# Patient Record
Sex: MALE | Race: WHITE | Employment: OTHER | URBAN - METROPOLITAN AREA
[De-identification: names, ages, dates, MRNs, and addresses within clinical notes are randomized per-mention and may not be internally consistent; named-entity substitution may affect disease eponyms.]

---

## 2022-11-09 ENCOUNTER — HOSPITAL ENCOUNTER (OUTPATIENT)
Dept: PHYSICAL THERAPY | Age: 37
Discharge: HOME | End: 2022-11-09
Payer: OTHER GOVERNMENT

## 2022-11-09 PROCEDURE — 97530 THERAPEUTIC ACTIVITIES: CPT

## 2022-11-09 PROCEDURE — 97161 PT EVAL LOW COMPLEX 20 MIN: CPT

## 2022-11-09 NOTE — THERAPY EVALUATION
[facility] [email or web address]  [address] [facility]  [phone] fax    Plan of Care/ Statement of Necessity for Physical Therapy Services    Patient name: Srinivas Cadena Start of Care: 2022   Referral source: Marlin Bhatia MD : 1985    Medical Diagnosis: Fecal incontinence [R15.9]  Payor:  / Plan: Saul Beck 72 / Product Type:  /  Onset Date:2022    Treatment Diagnosis: bowel incontinence, PFD, difficulty voiding   Prior Hospitalization: see medical history Provider#: [id number]   Medications: Verified on Patient summary List    Comorbidities: history of malignant carcinoid tumor, nissen fundoplication, colon resection, chemo port placement, ileostomy reversal   Prior Level of Function: ind with all mobility, no leakage           The Plan of Care and following information is based on the information from the initial evaluation. Assessment/ alvarado information: Mr. Srinivas Cadena is a 43 y/o, M who present with c/o fecal incontinence. Problem started since 2022 after a surgery (ileostomy reversal). He had multiple procedures done since May 1956 (nissen fundoplication, colon resection, chemo port placement) due to malignant carcinoid tumor. He is able to have more awareness with bowel urgency at this time. He describes feeling \"a little catch and he's unable to close the region to get to the toilet. \" He has significant problem with holding gas; he notes more pressure with gas. Leakage usually happens mostly in the morning & evening. He has incomplete emptying sensation, needs to return to void 3-4 times in the morning, 2-3 times in the morning. Bowel frequency is 3-10 times/day. He still uses stool softener & Metamucil about 2-3 times a day which helps min with forming fecal matter. He uses a couple pads a week depending on the bowel consistency. Pt denies any problem with bladder. Some pain/burning with straining.  He had history of internal hemorrhoid (4 times). Evaluation reveals patient with significant hypertonicity & hypersensitivity of rectal sphincter, puborectalis & pubococcygeus muscles. Strength & endurance deemed fair (2/5, 14 sec hold). Also noted challenged with relaxation. Anal reflex was intact. Patient may benefit from physical therapy to address these limitations to improve his QOL. Evaluation Complexity History HIGH Complexity :3+ comorbidities / personal factors will impact the outcome/ POC ; Examination LOW Complexity : 1-2 Standardized tests and measures addressing body structure, function, activity limitation and / or participation in recreation  ;Presentation MEDIUM Complexity : Evolving with changing characteristics  ; Clinical Decision Making MEDIUM Complexity : FOTO score of 26-74  Overall Complexity Rating: LOW     Problem List: Pelvic pain/dysfunction, Decreased pelvic floor mm awareness, Decreased pelvic floor mm strength, Use of accessory muscles, Improper voiding habits, and Hypertonus of pelvic floor    Treatment Plan may include any combination of the following:   Therapeutic exercise, Urge suppression techniques, Neuromuscular re-education, Manual therapy, Physical agent/modality, and Patient education  Patient / Family readiness to learn indicated by: asking questions, trying to perform skills, and interest    Persons(s) to be included in education: patient (P)    Barriers to Learning/Limitations: None    Patient Goal (s): improve leakage    Patient Self Reported Health Status: good    Rehabilitation Potential: good    Short Term Goals: To be accomplished in 4 weeks:   1. Patient will demonstrate accurate performance of home exercise program as adjunct to PT clinic visits. Eval status: will review next visit     2. Patient will have decreased fecal incontinence episodes to < or =3x/week for more normal function. Eval status: more than 1x/week    3.  Patient will report at least 50% improvement with ease for voiding bowel to improve his QOL. Eval status: difficulty with emptying. Long Term Goals: To be accomplished in 8 weeks:   1. Patient will be independent in HEP performance for maintenance of pelvic floor program and increased quality of life. Eval status: will review next visit     2. Patient will have increased PFM motor performance by >/= 1/2 to 1 grade for improvement in function and increased quality of life. Eval status: 2/5     3. Patient will have decreased fecal incontinence episodes to < or =4x/month for more normal function. Eval status: more than 1x/week     4. Patient will have FOTO score change of 11 points indicating improvement in function. Eval status: 48      Frequency / Duration: Patient to be seen 1-2 times per week for 8 weeks. Patient/ Caregiver education and instruction: Diagnosis, prognosis, Proper Voiding Habits, Diet, Pain Management, Exercises, and Bladder Retraining      Terry Berry 11/9/2022 8:29 AM    ________________________________________________________________________    I certify that the above Therapy Services are being furnished while the patient is under my care. I agree with the treatment plan and certify that this therapy is necessary.     [de-identified] Signature:____________Date:_________TIME:________     Ligia Singh MD  ** Signature, Date and Time must be completed for valid certification **      Please sign and return to [facility]   [address] [place] [address]  [phone] fax

## 2022-11-09 NOTE — THERAPY EVALUATION
PF Daily Treatment Note  Patient Name: Hema Mcclain  Date:2022  [x]  Patient  Verified  Insurance:Payor: MICHAEL / Plan: Saul Beck 72 / Product Type:  /   In time: 3:01  Out time: 3:50  Total Treatment Time (min): 49  Total Timed Codes (min): 25  1:1 Treatment Time ( only): 52   Visit #: 1 of 1    Treatment Area: [x] Pelvic Floor     [] Other:  SUBJECTIVE  Pain Level (0-10 scale): 0/10  Any medication changes, allergies to medications, adverse drug reactions, diagnosis change, or new procedure performed?: [x] No    [] Yes (see summary sheet for update)    Mr. Hema Mcclain is a 41 y/o, M who present with c/o fecal incontinence. Problem started since 2022 after a surgery . He had multiple surgeries (nissen fundoplication, colon resection, chemo port placement, ileostomy reversal) due to malignant carcinoid tumor. He was able to have more awareness with bowel urgency. He described it as a little catch and he's unable to close the region. He has significant problem with holding gas; he notes more pressure with gas. Leakage usually happens mostly in the morning & evening. He has incomplete emptying sensation, needs to return to void 3-4 times in the morning, 2-3 times in the morning. Bowel frequency is 3-10 times/day. He still uses stool softener daily & Metamucil about 2-3 times a day which helps min with forming fecal matter. He uses a couple pads a week depending on the bowel consistency. He denies any problem with bladder. Some pain/burning with straining. He had history of internal hemorrhoid (4 times).         Pelvic Floor Dysfunction Evaluation    Musculoskeletal Screen:    Skin Integrity:  [] Healthy [x] Red  [] Labia Atrophy [] Fragile    Sensation: [] Intact [] Diminished:    Muscle Bulk: [] Symmetrical  [] Well-developed [] Atrophied:  []L   []R   []B    Prolapse: [] Cystocele:   [] Rectocele:    PERF Score (Performance/Endurance/Repetitions/Flicks)   P: 2 E: 14 seconds R: F: Total:    Patient has failed previous pelvic floor muscle training? [] Yes    [] No    EMG Evaluation:  [] N/A [] Deferred secondary to:    Channel A: Electrode type:  [] Internal    [] Surface    [] Vaginal    [] Rectal  Channel B: Electrode location:    Baseline Resting Tone (1 minute)  Channel A (microvolts): Quality:  [] Normal [] Irradic [] Elevated  Channel B (microvolts): Slow Twitch: (10 second hold, 20 second rest)  Channel A (microvolts): Quality:[] Quick/slow rise [] Low net rise  Net rise (microvolts):   [] Slow Relaxation [] Incoordination       [] Unable to contract [] Fatigues at (sec):       [] Elevated baseline between contractions    Channel B (microvolts): Use of Accessory Muscles: [] Minimal  Net rise (microvolts):   [] Moderate  [] Excessive       [] No use of accessory muscles    Fast Twitch (3 second hold, 10 second rest)  Channel A (microvolts): Quality:[] Quick/slow rise [] Low net rise  Net rise (microvolts):   [] Slow Relaxation [] Incoordination       [] Unable to contract [] Fatigues at (sec):       [] Elevated baseline between contractions    Channel B (microvolts):  Use of Accessory Muscles: [] Minimal  Net rise (microvolts):   [] Moderate  [] Excessive       [] No use of accessory muscles    Optional Tests:  Lower abdominal strength: /5    Comments/Additional Tests:      OBJECTIVE    24 min eval    25 min Therapeutic Activity:  []  See flow sheet :Pt edu within scope of practice on prognosis, POC, PFM anatomy/physiology, bowel consistency, bowel irritants, HEP   Rationale: Increase pelvic floor muscle strength, Improve quality of pelvic floor contractions, Decrease resting tone of the pelvic floor, Increase tissue extensibility of the pelvic floor muscles, Increase core strength, Inhibit abnormal muscle activity, and Improve lumbosacral and coccygeal mobility in order to Increase fecal continence, Decrease bowel urgency, Improve frequency and ease of bowel movements, and Improve ability to perform ADLs. min Patient Education: [x] Review HEP    [] Progressed/Changed HEP based on:   [] positioning   [] body mechanics   [] transfers   [] heat/ice application        Other Objective/Functional Measures:   []baseline resting tone: []slow twitch mms   []fast twitch mms      Pain Level (0-10 scale) post treatment: 0/10    ASSESSMENT/Changes in Function: see POC please    []  Decrease # of leaks   [] No change []  Improving [] Resolved     []  Decrease hypertonus [] No change []  Improving [] Resolved     []  Increase void interval [] No change []  Improving [] Resolved     []  Increase PF strength [] No change []  Improving [] Resolved     []  Increase PF endurance [] No change []  Improving [] Resolved     []  Increase endurance [] No change []  Improving [] Resolved     []  Decrease # of pads [] No change []  Improving [] Resolved     []  Decrease pain [] No change []  Improving [] Resolved       Patient will continue to benefit from skilled PT services to modify and progress therapeutic interventions, address functional mobility deficits, address ROM deficits, address strength deficits, analyze and address soft tissue restrictions, analyze and cue movement patterns, analyze and modify body mechanics/ergonomics, assess and modify postural abnormalities, address imbalance/dizziness and instruct in home and community integration to attain remaining goals.      [x]  See Plan of Care         PLAN  [x]  Upgrade activities as tolerated     [x]  Continue plan of care  []  Update interventions per flow sheet       []  Discharge due to:_  []  Other:_          Vernon Jim 11/9/2022  8:28 AM

## 2022-11-14 ENCOUNTER — HOSPITAL ENCOUNTER (OUTPATIENT)
Dept: PHYSICAL THERAPY | Age: 37
Discharge: HOME | End: 2022-11-14
Payer: OTHER GOVERNMENT

## 2022-11-14 PROCEDURE — 97112 NEUROMUSCULAR REEDUCATION: CPT

## 2022-11-14 PROCEDURE — 97530 THERAPEUTIC ACTIVITIES: CPT

## 2022-11-14 NOTE — PROGRESS NOTES
PF DAILY TREATMENT NOTE 3    Patient Name: Sofia Núñez  Date:2022  : 1985  [x]  Patient  Verified  Payor: MICHAEL / Plan: Saul Beck 74 / Product Type:  /    In time: 9:35  Out time: 10:32  Total Treatment Time (min): 62  Visit #: 2 of 16    Treatment Area: [x] Pelvic Floor     [] Other:    SUBJECTIVE  Pain Level (0-10 scale): 0/10  Any medication changes, allergies to medications, adverse drug reactions, diagnosis change, or new procedure performed?: [x] No    [] Yes (see summary sheet for update)  Subjective functional status/changes:   [] No changes reported  Pt reports increasing fiber intake during the last 1 week and he's been having ~10 BMs a day. Pt reports no straining and good amount voided most of the time. OBJECTIVE    23 min Therapeutic Activity:  []  See flow sheet :    []  Increase Tissue extensibility        [x]  Assess fiber intake    [x]  Assess voiding habits  [x]  Assess bowel habits  []  Other:   Rationale: Increase pelvic floor muscle strength, Improve quality of pelvic floor contractions, Decrease resting tone of the pelvic floor, Increase tissue extensibility of the pelvic floor muscles, Increase core strength, Inhibit abnormal muscle activity, and Improve lumbosacral and coccygeal mobility in order to Increase fecal continence, Decrease bowel urgency, Improve frequency and ease of bowel movements, and Improve ability to perform ADLs.       34 min Neuromuscular Re-education:  []  See flow sheet :   [x]  Pelvic floor strengthening                 []  Pelvic floor downtraining  [x]  Quality pelvic floor contractions       [x]  Relaxation techniques  []  Urge suppression exercises  []  Other:  Rationale: Increase pelvic floor muscle strength, Improve quality of pelvic floor contractions, Decrease resting tone of the pelvic floor, Increase tissue extensibility of the pelvic floor muscles, Increase core strength, Inhibit abnormal muscle activity, and Improve lumbosacral and coccygeal mobility in order to Increase fecal continence, Decrease bowel urgency, Improve frequency and ease of bowel movements, and Improve ability to perform ADLs. With   [] TE   [] TA   [] neuro  [] manual   [] other: Patient Education: [x] Review HEP    [] Progressed/Changed HEP based on:   [] positioning   [] body mechanics   [] transfers   [] heat/ice application    [] other:      Other Objective/Functional Measures:   []baseline resting tone:    []slow twitch mms   []fast twitch mms    Pain Level (0-10 scale) post treatment: 0/10    ASSESSMENT/Changes in Function: Pt demonstrates good understanding with appropriate fiber intake, healthy bowel & bladder function and HEP. He's challenged with prolonged holding of PFM contraction, may not benefit from Presley's protocol, will perform with Biofeedback to re-assess next visit. Progress internal manual/pelvic wand use as tolerated.      []  Decrease # of leaks   [] No change []  Improving [] Resolved     []  Decrease hypertonus [] No change []  Improving [] Resolved     []  Increase void interval [] No change []  Improving [] Resolved     []  Increase PF strength [] No change []  Improving [] Resolved     []  Increase PF endurance [] No change []  Improving [] Resolved     []  Increase endurance [] No change []  Improving [] Resolved     []  Decrease # of pads [] No change []  Improving [] Resolved     []  Decrease pain [] No change []  Improving [] Resolved     []  Increased coordination [] No change []  Improving [] Resolved     []  Increased Bowel Frequency [] No change []  Improving [] Resolved       Patient will continue to benefit from skilled PT services to modify and progress therapeutic interventions, address functional mobility deficits, address ROM deficits, address strength deficits, analyze and address soft tissue restrictions, analyze and cue movement patterns, analyze and modify body mechanics/ergonomics, assess and modify postural abnormalities, address imbalance/dizziness, and instruct in home and community integration to attain remaining goals. [x]  See Plan of Care  []  See progress note/recertification  []  See Discharge Summary         Progress towards goals / Updated goals:  Short Term Goals: To be accomplished in 4 weeks:   1. Patient will demonstrate accurate performance of home exercise program as adjunct to PT clinic visits. Eval status: will review next visit  Current: good understanding 11-14-22     2. Patient will have decreased fecal incontinence episodes to < or =3x/week for more normal function. Eval status: more than 1x/week     3. Patient will report at least 50% improvement with ease for voiding bowel to improve his QOL. Eval status: difficulty with emptying. Long Term Goals: To be accomplished in 8 weeks:   1. Patient will be independent in HEP performance for maintenance of pelvic floor program and increased quality of life. Eval status: will review next visit     2. Patient will have increased PFM motor performance by >/= 1/2 to 1 grade for improvement in function and increased quality of life. Eval status: 2/5     3. Patient will have decreased fecal incontinence episodes to < or =4x/month for more normal function. Eval status: more than 1x/week     4. Patient will have FOTO score change of 11 points indicating improvement in function.   Eval status: 48      PLAN  [x]  Upgrade activities as tolerated     [x]  Continue plan of care  []  Update interventions per flow sheet       []  Discharge due to:_  []  Other:_      Cleve Pearson 11/14/2022  7:56 AM    Future Appointments   Date Time Provider Zoraida Marsh   11/14/2022  9:45 AM Dianna Keyes [id number] [facility]   11/28/2022 10:30 AM Iraj MYLES [id number] [facility]   12/5/2022  9:00 AM Terry Beckford [id number] [facility]   12/7/2022 10:00 AM Iraj MYLES [id number] [facility]   12/12/2022  9:00 AM Dianna Keyes [id number] [facility]   12/14/2022 11:30 AM Lucie Patricia Terry MYLES [id number] [facility]   12/19/2022  9:00 AM Earlean Canavan [id number] [facility]   12/21/2022 11:30 AM Earlean Canavan [id number] [facility]

## 2022-11-28 ENCOUNTER — HOSPITAL ENCOUNTER (OUTPATIENT)
Dept: PHYSICAL THERAPY | Age: 37
Discharge: HOME | End: 2022-11-28
Payer: OTHER GOVERNMENT

## 2022-11-28 PROCEDURE — 90912 BFB TRAINING 1ST 15 MIN: CPT

## 2022-11-28 PROCEDURE — 90913 BFB TRAINING EA ADDL 15 MIN: CPT

## 2022-11-28 PROCEDURE — 97112 NEUROMUSCULAR REEDUCATION: CPT

## 2022-11-28 PROCEDURE — 97530 THERAPEUTIC ACTIVITIES: CPT

## 2022-11-28 NOTE — PROGRESS NOTES
PF DAILY TREATMENT NOTE 3-16    Patient Name: Zack Eubanks  Date:2022  : 1985  [x]  Patient  Verified  Payor:  / Plan: [facility] / Product Type:  /    In time: 10:32  Out time:11:35  Total Treatment Time (min): 63  Visit #: 3 of 16    Treatment Area: [x] Pelvic Floor     [] Other:    SUBJECTIVE  Pain Level (0-10 scale): 5/10 migraine  Any medication changes, allergies to medications, adverse drug reactions, diagnosis change, or new procedure performed?: [x] No    [] Yes (see summary sheet for update)  Subjective functional status/changes:   [] No changes reported  Pt reports doing his best with exercises & bowel consistency management.     OBJECTIVE    Modality rationale: increase tissue extensibility and increase muscle contraction/control to improve the patients ability to improve bowel leakage   Min Type Additional Details    [] Estim:  []Unatt       []IFC  []Premod                        []Other:  []w/ice   []w/heat  Position:  Location:   30 [] Estim: []Att    []TENS instruct  []NMES                    [x]Other: Biofeedback  []w/US   []w/ice   []w/heat  Position: supine, wedge under knees  Location: Internal sensor for PFM    []  Ultrasound: []Continuous   [] Pulsed                           []1MHz   []3MHz Position:  Location:    []  Ice     []  heat  []  Ice massage  []  Laser   []  Anodyne Position:  Location:   [] Skin assessment post-treatment:  [x]intact []redness- no adverse reaction    []redness - adverse reaction:       23 min Therapeutic Activity:  []  See flow sheet :    []  Increase Tissue extensibility        [x]  Assess fiber intake    [x]  Assess voiding habits  [x]  Assess bowel habits  [x]  Other: HEP progression, pelvic wand use  Rationale: Increase pelvic floor muscle strength, Improve quality of pelvic floor contractions, Decrease resting tone of the pelvic floor, Increase tissue extensibility of the pelvic floor muscles, Increase core strength, Inhibit abnormal muscle activity, and Improve lumbosacral and coccygeal mobility in order to Increase fecal continence, Decrease bowel urgency, Improve frequency and ease of bowel movements, and Improve ability to perform ADLs. 10 min Neuromuscular Re-education:  []  See flow sheet :   []  Pelvic floor strengthening                 []  Pelvic floor downtraining  []  Quality pelvic floor contractions       [x]  Relaxation techniques (mindfulness, diaphragmatic breathing with jaw relaxation & prolonged exhallation)  []  Urge suppression exercises  []  Other:  Rationale: Increase pelvic floor muscle strength, Improve quality of pelvic floor contractions, Decrease resting tone of the pelvic floor, Increase tissue extensibility of the pelvic floor muscles, Increase core strength, Inhibit abnormal muscle activity, and Improve lumbosacral and coccygeal mobility in order to Increase fecal continence, Decrease bowel urgency, Improve frequency and ease of bowel movements, and Improve ability to perform ADLs. With   [] TE   [] TA   [] neuro  [] manual   [] other: Patient Education: [x] Review HEP    [] Progressed/Changed HEP based on:   [] positioning   [] body mechanics   [] transfers   [] heat/ice application    [] other:      Other Objective/Functional Measures:   []baseline resting tone: 22-30 µV; able to improve with reluxation/breathing after 2-3 minutes; tone improved to 12-15 µV  []slow twitch mms 17 sec hold, 15 sec rest, 5 rep: work: 31.7 µV, rest: 11.1 µV   Min challenge with maintaining PFM contraction for 17 sec after 3 reps  Improved coordination and max squeeze at the beginning of contraction  HEP: 5 reps, 17 sec hold, 15 sec rest, 1-4 sets/day   fast twitch mms: good coordination with fast twist     Pain Level (0-10 scale) post treatment: 5/10 migraine     ASSESSMENT/Changes in Function: Pt demonstrates improving awareness & coordination of PFM.  Will progress with internal as tolerated to improve resting tone. []  Decrease # of leaks   [] No change []  Improving [] Resolved     []  Decrease hypertonus [] No change []  Improving [] Resolved     []  Increase void interval [] No change []  Improving [] Resolved     []  Increase PF strength [] No change []  Improving [] Resolved     []  Increase PF endurance [] No change []  Improving [] Resolved     []  Increase endurance [] No change []  Improving [] Resolved     []  Decrease # of pads [] No change []  Improving [] Resolved     []  Decrease pain [] No change []  Improving [] Resolved     []  Increased coordination [] No change []  Improving [] Resolved     []  Increased Bowel Frequency [] No change []  Improving [] Resolved       Patient will continue to benefit from skilled PT services to modify and progress therapeutic interventions, address functional mobility deficits, address ROM deficits, address strength deficits, analyze and address soft tissue restrictions, analyze and cue movement patterns, analyze and modify body mechanics/ergonomics, assess and modify postural abnormalities, address imbalance/dizziness, and instruct in home and community integration to attain remaining goals. [x]  See Plan of Care  []  See progress note/recertification  []  See Discharge Summary         Progress towards goals / Updated goals:  Short Term Goals: To be accomplished in 4 weeks:   1. Patient will demonstrate accurate performance of home exercise program as adjunct to PT clinic visits. Eval status: will review next visit  Current: good understanding 11-14-22     2. Patient will have decreased fecal incontinence episodes to < or =3x/week for more normal function. Eval status: more than 1x/week     3. Patient will report at least 50% improvement with ease for voiding bowel to improve his QOL. Eval status: difficulty with emptying. Long Term Goals: To be accomplished in 8 weeks:   1.  Patient will be independent in HEP performance for maintenance of pelvic floor program and increased quality of life. Eval status: will review next visit     2. Patient will have increased PFM motor performance by >/= 1/2 to 1 grade for improvement in function and increased quality of life. Eval status: 2/5     3. Patient will have decreased fecal incontinence episodes to < or =4x/month for more normal function. Eval status: more than 1x/week     4. Patient will have FOTO score change of 11 points indicating improvement in function.   Eval status: 48    PLAN  [x]  Upgrade activities as tolerated     [x]  Continue plan of care  []  Update interventions per flow sheet       []  Discharge due to:_  []  Other:_      Jodell Ser 11/28/2022  8:02 AM    Future Appointments   Date Time Provider Zoraida Marsh   11[facility]28[facility]2022 10:30 [id number]   12/5/2022  9:[facility]   12/7/2022 10:00 [facility] SHAMEKA [id number] [facility]   12/12/2022  9:00 AM [facility] [id number] [facility]   12/14/2022 11:30 AM [facility]   12/19/2022  9:00 AM [facility]   12/21/2022 11:30 [facility]

## 2022-12-05 ENCOUNTER — HOSPITAL ENCOUNTER (OUTPATIENT)
Dept: PHYSICAL THERAPY | Age: 37
Discharge: HOME | End: 2022-12-05
Payer: OTHER GOVERNMENT

## 2022-12-05 PROCEDURE — 97110 THERAPEUTIC EXERCISES: CPT

## 2022-12-05 PROCEDURE — 97530 THERAPEUTIC ACTIVITIES: CPT

## 2022-12-05 PROCEDURE — 97112 NEUROMUSCULAR REEDUCATION: CPT

## 2022-12-05 NOTE — PROGRESS NOTES
PF DAILY TREATMENT NOTE 3    Patient Name: Kamilla Jordan  Date:2022  : 1985  [x]  Patient  Verified  Payor: MICHAEL / Plan: [facility] / Product Type:  /    In time: 9:02  Out time: 10:00  Total Treatment Time (min): 58  Visit #: 4 of 16    Treatment Area: [x] Pelvic Floor     [] Other:    SUBJECTIVE  Pain Level (0-10 scale): 0/10  Any medication changes, allergies to medications, adverse drug reactions, diagnosis change, or new procedure performed?: [x] No    [] Yes (see summary sheet for update)  Subjective functional status/changes:   [] No changes reported  Pt reports having more difficulty with controlling due to the busy schedule at his work. He's more awareness of PFM tension      OBJECTIVE    25 min Therapeutic Exercise:  [] See flow sheet :   [x]  Pelvic floor strengthening                 []  Pelvic floor downtraining  []  Quality pelvic floor contractions       [x]  Relaxation techniques  []  Urge suppression exercises  [x]  Other: core & hips strengthening  Rationale: Increase pelvic floor muscle strength, Improve quality of pelvic floor contractions, Decrease resting tone of the pelvic floor, Increase tissue extensibility of the pelvic floor muscles, Increase core strength, Inhibit abnormal muscle activity, and Improve lumbosacral and coccygeal mobility in order to Increase fecal continence, Decrease bowel urgency, Improve frequency and ease of bowel movements, and Improve ability to perform ADLs.        10 min Therapeutic Activity:  []  See flow sheet :    []  Increase Tissue extensibility        [x]  Assess fiber intake    [x]  Assess voiding habits  [x]  Assess bowel habits  []  Other:   Rationale: Increase pelvic floor muscle strength, Improve quality of pelvic floor contractions, Decrease resting tone of the pelvic floor, Increase tissue extensibility of the pelvic floor muscles, Increase core strength, Inhibit abnormal muscle activity, and Improve lumbosacral and coccygeal mobility in order to Increase fecal continence, Decrease bowel urgency, Improve frequency and ease of bowel movements, and Improve ability to perform ADLs. 23 min Neuromuscular Re-education:  []  See flow sheet :   [x]  Pelvic floor strengthening                 []  Pelvic floor downtraining  [x]  Quality pelvic floor contractions       [x]  Relaxation techniques  []  Urge suppression exercises  [x]  Other: core & hips strengthening  Rationale: Increase pelvic floor muscle strength, Improve quality of pelvic floor contractions, Decrease resting tone of the pelvic floor, Increase tissue extensibility of the pelvic floor muscles, Increase core strength, Inhibit abnormal muscle activity, and Improve lumbosacral and coccygeal mobility in order to Increase fecal continence, Decrease bowel urgency, Improve frequency and ease of bowel movements, and Improve ability to perform ADLs. With   [] TE   [] TA   [] neuro  [] manual   [] other: Patient Education: [x] Review HEP    [] Progressed/Changed HEP based on:   [] positioning   [] body mechanics   [] transfers   [] heat/ice application    [] other:      Other Objective/Functional Measures:   []baseline resting tone:   []slow twitch mms   []fast twitch mms   Noted more challenged with Left LE    Pain Level (0-10 scale) post treatment: 1-2/10 soreness    ASSESSMENT/Changes in Function: Pt's making gradual progress with improving tone of PFM. He cont to be consistent with HEP and doesn't get/need pelvic wand at this time. Progress with additional strengthening therex for post chain; pt demonstrates challenge due to endurance, good form, min soreness/pain reported. Will cont to progress as tolerated.      []  Decrease # of leaks   [] No change []  Improving [] Resolved     []  Decrease hypertonus [] No change []  Improving [] Resolved     []  Increase void interval [] No change []  Improving [] Resolved     []  Increase PF strength [] No change [] Improving [] Resolved     []  Increase PF endurance [] No change []  Improving [] Resolved     []  Increase endurance [] No change []  Improving [] Resolved     []  Decrease # of pads [] No change []  Improving [] Resolved     []  Decrease pain [] No change []  Improving [] Resolved     []  Increased coordination [] No change []  Improving [] Resolved     []  Increased Bowel Frequency [] No change []  Improving [] Resolved       Patient will continue to benefit from skilled PT services to modify and progress therapeutic interventions, address functional mobility deficits, address ROM deficits, address strength deficits, analyze and address soft tissue restrictions, analyze and cue movement patterns, analyze and modify body mechanics/ergonomics, assess and modify postural abnormalities, address imbalance/dizziness, and instruct in home and community integration to attain remaining goals. [x]  See Plan of Care  []  See progress note/recertification  []  See Discharge Summary           Progress towards goals / Updated goals:  Short Term Goals: To be accomplished in 4 weeks:   1. Patient will demonstrate accurate performance of home exercise program as adjunct to PT clinic visits. Eval status: will review next visit  Current: good understanding 11-14-22     2. Patient will have decreased fecal incontinence episodes to < or =3x/week for more normal function. Eval status: more than 1x/week     3. Patient will report at least 50% improvement with ease for voiding bowel to improve his QOL. Eval status: difficulty with emptying. Long Term Goals: To be accomplished in 8 weeks:   1. Patient will be independent in HEP performance for maintenance of pelvic floor program and increased quality of life. Eval status: will review next visit     2. Patient will have increased PFM motor performance by >/= 1/2 to 1 grade for improvement in function and increased quality of life. Eval status: 2/5     3.  Patient will have decreased fecal incontinence episodes to < or =4x/month for more normal function. Eval status: more than 1x/week     4. Patient will have FOTO score change of 11 points indicating improvement in function.   Eval status: 48       PLAN  [x]  Upgrade activities as tolerated     [x]  Continue plan of care  []  Update interventions per flow sheet       []  Discharge due to:_  []  Other:_      Benjamin Angelo 12/5/2022  7:45 AM    Future Appointments   Date Time Provider Zoraida Marsh   12/5/2022  9:00 AM Arvind Merino MMCPTPB [facility]   12/7/2022 10:00 AM Pati MYLES DULVQDA [facility]   12/12/2022  9:00 AM Pati MYLES NGKHSSM [facility]   12/14/2022  9:00 AM Terry Baeza XNLHROP [facility]   12/19/2022  9:00 AM Terry Baeza MMCPTPB [facility]   12/21/2022 11:00 AM Terry Baeza MMCPTPB [facility]

## 2022-12-07 ENCOUNTER — HOSPITAL ENCOUNTER (OUTPATIENT)
Dept: PHYSICAL THERAPY | Age: 37
Discharge: HOME | End: 2022-12-07
Payer: OTHER GOVERNMENT

## 2022-12-07 PROCEDURE — 97140 MANUAL THERAPY 1/> REGIONS: CPT

## 2022-12-07 PROCEDURE — 97530 THERAPEUTIC ACTIVITIES: CPT

## 2022-12-07 PROCEDURE — 97110 THERAPEUTIC EXERCISES: CPT

## 2022-12-07 NOTE — PROGRESS NOTES
[facility] [place] [email or web address] OF [address]  [phone] fax    Pelvic Floor Progress Note    Patient name: Kathie Peabody Start of Care: 2022   Referral source: Fernanda Paula MD : 1985               Medical Diagnosis: Fecal incontinence [R15.9]  Payor:  / Plan: [employer] / Product Type:  /  Onset Date:2022               Treatment Diagnosis: bowel incontinence, PFD, difficulty voiding   Prior Hospitalization: see medical history Provider#: [id number]   Medications: Verified on Patient summary List    Comorbidities: history of malignant carcinoid tumor, nissen fundoplication, colon resection, chemo port placement, ileostomy reversal   Prior Level of Function: ind with all mobility, no leakage          Visits from Start of Care: 5    Missed Visits: 0    Established Goals:           Excellent Good         Limited           None  [x] Increase Pelvic MM strength []  []  [x]  []  [x] Decrease Pelvic MM hypertonus []  []  [x]  []  [x] Decrease Incontinence Episodes []  []  [x]  []   [] Improve Voiding Habits  []  []  []  []  [] Decreased Urgency   []  []  []  []    Key Functional Changes: improving ease with voiding, improving coordination of PFM. Updated Goals: to be achieved in 8 weeks:  Short Term Goals: To be accomplished in 4 weeks:   1. Patient will demonstrate accurate performance of home exercise program as adjunct to PT clinic visits. Status at Progress note: good understanding 22     2. Patient will have decreased fecal incontinence episodes to < or =3x/week for more normal function. Status at Progress note: more than 1x/week; no significant change 22     3. Patient will report at least 50% improvement with ease for voiding bowel to improve his QOL. Status at Progress note: difficulty with emptying; improving gradually 22        Long Term Goals:  To be accomplished in 8 weeks: 1. Patient will be independent in HEP performance for maintenance of pelvic floor program and increased quality of life. Status at Progress note: good understanding 12-7-22     2. Patient will have increased PFM motor performance by >/= 1/2 to 1 grade for improvement in function and increased quality of life. Status at Progress note: 2/5; no significant change 12-7-22     3. Patient will have decreased fecal incontinence episodes to < or =4x/month for more normal function. Status at Progress note: more than 1x/week; no significant change 12-7-22     4. Patient will have Bowel Leakage FOTO score change of 11 points indicating  Status at Progress note: initial assessment: 48; no significant change 12-7-22       ASSESSMENT/RECOMMENDATIONS: Pt's making gradual progress with PT. He reports improving awareness and tension of PFM. Due to focus to optimize PFM, he notes mild increase of fecal leakage. This mainly occurs in the morning when he just wakes up; only small amount, no change of frequency of intensity with other incontinence episode. Pt demonstrates challenge with core & glute strengthening. He reports WNL with bladder function and improving ease with voiding bowel (only requires some abdominal massage no other manual assist needed). Pt would cont to benefit from skilled Pelvic Floor PT to Increase pelvic floor muscle strength, Improve quality of pelvic floor contractions, Decrease resting tone of the pelvic floor, Increase tissue extensibility of the pelvic floor muscles, Increase core strength, Inhibit abnormal muscle activity, and Improve lumbosacral and coccygeal mobility in order to Increase fecal continence, Decrease bowel urgency, Improve frequency and ease of bowel movements, and Improve ability to perform ADLs.          [x]Continue therapy per initial plan/protocol at a frequency of  1-2 x per week for 6 weeks  []Continue therapy with the following recommended changes:_____________________ _____________________________________________________________________  []Discontinue therapy progressing towards or have reached established goals  []Discontinue therapy due to lack of appreciable progress towards goals  []Discontinue therapy due to lack of attendance or compliance  []Await Physician's recommendations/decisions regarding therapy  []Other:________________________________________________________________    Thank you for this referral.   Luisa Munozyuly 12/7/2022 9:24 AM  NOTE TO PHYSICIAN:  [address]   FAX TO [facility]: [phone]  If you are unable to process this request in 24 hours please contact our office: [phone]    I have read the above report and request that my patient continue as recommended. I have read the above report and request that my patient continue therapy with the following changes/special instructions:___________________________________________________________  I have read the above report and request that my patient be discharged from therapy.     Physician's Signature:____________Date:_________TIME:________     Nelly Dejesus MD  ** Signature, Date and Time must be completed for valid certification **

## 2022-12-07 NOTE — PROGRESS NOTES
PF DAILY TREATMENT NOTE 3-    Patient Name: Soheila Nunez  Date:2022  : 1985  [x]  Patient  Verified  Payor: MICHAEL / Plan: Saul Beck 72 / Product Type:  /    In time: 10:00  Out time: 11:05  Total Treatment Time (min): 65  Visit #: 5 of 16      Treatment Area: [x] Pelvic Floor     [] Other:    SUBJECTIVE  Pain Level (0-10 scale): 0/10  Any medication changes, allergies to medications, adverse drug reactions, diagnosis change, or new procedure performed?: [x] No    [] Yes (see summary sheet for update)  Subjective functional status/changes:   [] No changes reported  Pt reports doing ok, not too sore after last session. He's still doing well with the HEP. OBJECTIVE    Modality rationale: decrease pain and increase tissue extensibility to improve the patients ability to tolerate ADLs.    Min Type Additional Details    [] Estim:  []Unatt       []IFC  []Premod                        []Other:  []w/ice   []w/heat  Position:  Location:    [] Estim: []Att    []TENS instruct  []NMES                    []Other:  []w/US   []w/ice   []w/heat  Position:  Location:    []  Ultrasound: []Continuous   [] Pulsed                           []1MHz   []3MHz Position:  Location:   10 (5 min during TA) []  Ice     [x]  heat  []  Ice massage  []  Laser   []  Anodyne Position: seated  Location: PFM   [x] Skin assessment post-treatment:  [x]intact []redness- no adverse reaction    []redness - adverse reaction:         40 min Therapeutic Exercise:  [x] See flow sheet :   [x]  Pelvic floor strengthening                 []  Pelvic floor downtraining  []  Quality pelvic floor contractions       [x]  Relaxation techniques  []  Urge suppression exercises  []  Other:  Rationale: Increase pelvic floor muscle strength, Improve quality of pelvic floor contractions, Decrease resting tone of the pelvic floor, Increase tissue extensibility of the pelvic floor muscles, Increase core strength, Inhibit abnormal muscle activity, and Improve lumbosacral and coccygeal mobility in order to Increase fecal continence, Decrease bowel urgency, Improve frequency and ease of bowel movements, and Improve ability to perform ADLs. 10 min Therapeutic Activity:  []  See flow sheet :    []  Increase Tissue extensibility        [x]  Assess fiber intake    [x]  Assess voiding habits  [x]  Assess bowel habits  [x]  Other: FOTO & goals reassessment    Rationale: Increase pelvic floor muscle strength, Improve quality of pelvic floor contractions, Decrease resting tone of the pelvic floor, Increase tissue extensibility of the pelvic floor muscles, Increase core strength, Inhibit abnormal muscle activity, and Improve lumbosacral and coccygeal mobility in order to Increase fecal continence, Decrease bowel urgency, Improve frequency and ease of bowel movements, and Improve ability to perform ADLs. 10 min Manual Therapy:  intra-rectal MFR, leonel's massage   The manual therapy interventions were performed at a separate and distinct time from the therapeutic activities interventions. Rationale: Increase pelvic floor muscle strength, Improve quality of pelvic floor contractions, Decrease resting tone of the pelvic floor, and Increase tissue extensibility of the pelvic floor muscles in order to Increase fecal continence and Improve ability to perform ADLs.              With   [] TE   [] TA   [] neuro  [] manual   [] other: Patient Education: [x] Review HEP    [] Progressed/Changed HEP based on:   [] positioning   [] body mechanics   [] transfers   [] heat/ice application    [] other:      Other Objective/Functional Measures:   []baseline resting tone:   []slow twitch mms   []fast twitch mms    Pain Level (0-10 scale) post treatment: 0/10    ASSESSMENT/Changes in Function: see Progress note please      []  Decrease # of leaks   [] No change []  Improving [] Resolved     []  Decrease hypertonus [] No change []  Improving [] Resolved     [] Increase void interval [] No change []  Improving [] Resolved     []  Increase PF strength [] No change []  Improving [] Resolved     []  Increase PF endurance [] No change []  Improving [] Resolved     []  Increase endurance [] No change []  Improving [] Resolved     []  Decrease # of pads [] No change []  Improving [] Resolved     []  Decrease pain [] No change []  Improving [] Resolved     []  Increased coordination [] No change []  Improving [] Resolved     []  Increased Bowel Frequency [] No change []  Improving [] Resolved       Patient will continue to benefit from skilled PT services to modify and progress therapeutic interventions, address functional mobility deficits, address ROM deficits, address strength deficits, analyze and address soft tissue restrictions, analyze and cue movement patterns, analyze and modify body mechanics/ergonomics, assess and modify postural abnormalities, address imbalance/dizziness, and instruct in home and community integration to attain remaining goals. []  See Plan of Care  [x]  See progress note/recertification  []  See Discharge Summary           Progress towards goals / Updated goals:  Short Term Goals: To be accomplished in 4 weeks:   1. Patient will demonstrate accurate performance of home exercise program as adjunct to PT clinic visits. Eval status: will review next visit  Current: good understanding 11-14-22     2. Patient will have decreased fecal incontinence episodes to < or =3x/week for more normal function. Eval status: more than 1x/week  Current: no significant change 12-7-22     3. Patient will report at least 50% improvement with ease for voiding bowel to improve his QOL. Eval status: difficulty with emptying. Current: improving gradually 12-7-22      Long Term Goals: To be accomplished in 8 weeks:   1. Patient will be independent in HEP performance for maintenance of pelvic floor program and increased quality of life.    Eval status: will review next visit  Current: good understanding 12-7-22     2. Patient will have increased PFM motor performance by >/= 1/2 to 1 grade for improvement in function and increased quality of life. Eval status: 2/5  Current: no significant change 12-7-22     3. Patient will have decreased fecal incontinence episodes to < or =4x/month for more normal function. Eval status: more than 1x/week  Current: no significant change 12-7-22     4. Patient will have Bowel Leakage FOTO score change of 11 points indicating improvement in function.   Eval status: 48  Current: no significant change 12-7-22      PLAN  [x]  Upgrade activities as tolerated     [x]  Continue plan of care  []  Update interventions per flow sheet       []  Discharge due to:_  []  Other:_      Tremaine Anne 12/7/2022  9:21 AM    Future Appointments   Date Time Provider Zoraida Marsh   12/7/2022 10:00 AM Lyndee Severs [email or web address] [address]   12/12/2022  9:00 AM Irma MYLES [id number] [address]   12/14/2022  9:00 AM Irma MYLES [id number] [address]   12/19/2022  9:00 AM Terry Thorne [id number] [address]   12/21/2022 11:00 AM Emigdio Thorne [facility] [address]

## 2022-12-12 ENCOUNTER — HOSPITAL ENCOUNTER (OUTPATIENT)
Dept: PHYSICAL THERAPY | Age: 37
Discharge: HOME | End: 2022-12-12
Payer: OTHER GOVERNMENT

## 2022-12-12 PROCEDURE — 97530 THERAPEUTIC ACTIVITIES: CPT

## 2022-12-12 PROCEDURE — 97110 THERAPEUTIC EXERCISES: CPT

## 2022-12-12 PROCEDURE — 97014 ELECTRIC STIMULATION THERAPY: CPT

## 2022-12-12 NOTE — PROGRESS NOTES
PF DAILY TREATMENT NOTE 3-16    Patient Name: Kathie Peabody  Date:2022  : 1985  [x]  Patient  Verified  Payor:  / Plan: Ramakrishna Toscano / Product Type:  /    In time: 9:07  Out time: 10:12  Total Treatment Time (min): 65  Visit #: 1 of 8    Treatment Area: [x] Pelvic Floor     [] Other:      SUBJECTIVE  Pain Level (0-10 scale):  310  Any medication changes, allergies to medications, adverse drug reactions, diagnosis change, or new procedure performed?: [x] No    [] Yes (see summary sheet for update)  Subjective functional status/changes:   [] No changes reported  Pt reports more leakage after manual. He had to have 5 BM and incomplete voiding sensation.  He has persistent urgency but no actual pain with Pelvic Floor region      OBJECTIVE    Modality rationale: increase tissue extensibility and increase muscle contraction/control to improve the patients ability to voiding ease & tolerance with ADLs   Min Type Additional Details   10 [] Estim:  []Unatt       []IFC  []Premod                        [x]Other: High voltage []w/ice   [x]w/heat  Position: prone  Location: PFM    [] Estim: []Att    []TENS instruct  []NMES                    []Other:  []w/US   []w/ice   []w/heat  Position:  Location:    []  Ultrasound: []Continuous   [] Pulsed                           []1MHz   []3MHz Position:  Location:    []  Ice     []  heat  []  Ice massage  []  Laser   []  Anodyne Position:  Location:   [] Skin assessment post-treatment:  []intact []redness- no adverse reaction    []redness - adverse reaction:         45 min Therapeutic Exercise:  [x] See flow sheet :   [x]  Pelvic floor strengthening                 []  Pelvic floor downtraining  []  Quality pelvic floor contractions       [x]  Relaxation techniques  []  Urge suppression exercises  []  Other:  Rationale: Increase pelvic floor muscle strength, Improve quality of pelvic floor contractions, Decrease resting tone of the pelvic floor, Increase tissue extensibility of the pelvic floor muscles, Increase core strength, Inhibit abnormal muscle activity, and Improve lumbosacral and coccygeal mobility in order to Increase fecal continence, Decrease bowel urgency, Improve frequency and ease of bowel movements, Improve ability to undergo a gynecological exam, and Improve ability to perform ADLs. 10 min Therapeutic Activity:  []  See flow sheet :    []  Increase Tissue extensibility        [x]  Assess fiber intake    [x]  Assess voiding habits  [x]  Assess bowel habits  []  Other:   Rationale: Increase pelvic floor muscle strength, Improve quality of pelvic floor contractions, Decrease resting tone of the pelvic floor, Increase tissue extensibility of the pelvic floor muscles, Increase core strength, Inhibit abnormal muscle activity, and Improve lumbosacral and coccygeal mobility in order to Increase fecal continence, Decrease bowel urgency, Improve frequency and ease of bowel movements, Improve ability to undergo a gynecological exam, and Improve ability to perform ADLs. With   [] TE   [] TA   [] neuro  [] manual   [] other: Patient Education: [x] Review HEP    [] Progressed/Changed HEP based on:   [] positioning   [] body mechanics   [] transfers   [] heat/ice application    [] other:      Other Objective/Functional Measures:   []baseline resting tone:   []slow twitch mms   []fast twitch mms    Pain Level (0-10 scale) post treatment: 0/10    ASSESSMENT/Changes in Function: Pt cont to be challenged with more than 5 sec hold of PFM contraction. Discussed NMES use if cont to struggle with endurance of PFM. Pt reports significant pain/tightness relief with TENS. Will progress as tolerated.      []  Decrease # of leaks   [] No change []  Improving [] Resolved     []  Decrease hypertonus [] No change []  Improving [] Resolved     []  Increase void interval [] No change []  Improving [] Resolved     []  Increase PF strength [] No change [] Improving [] Resolved     []  Increase PF endurance [] No change []  Improving [] Resolved     []  Increase endurance [] No change []  Improving [] Resolved     []  Decrease # of pads [] No change []  Improving [] Resolved     []  Decrease pain [] No change []  Improving [] Resolved     []  Increased coordination [] No change []  Improving [] Resolved     []  Increased Bowel Frequency [] No change []  Improving [] Resolved       Patient will continue to benefit from skilled PT services to modify and progress therapeutic interventions, address functional mobility deficits, address ROM deficits, address strength deficits, analyze and address soft tissue restrictions, analyze and cue movement patterns, analyze and modify body mechanics/ergonomics, assess and modify postural abnormalities, address imbalance/dizziness, and instruct in home and community integration to attain remaining goals. []  See Plan of Care  [x]  See progress note/recertification  []  See Discharge Summary         Progress towards goals / Updated goals:  Short Term Goals: To be accomplished in 4 weeks:   1. Patient will demonstrate accurate performance of home exercise program as adjunct to PT clinic visits. Status at Progress note: good understanding 11-14-22     2. Patient will have decreased fecal incontinence episodes to < or =3x/week for more normal function. Status at Progress note: more than 1x/week; no significant change 12-7-22     3. Patient will report at least 50% improvement with ease for voiding bowel to improve his QOL. Status at Progress note: difficulty with emptying; improving gradually 12-7-22        Long Term Goals: To be accomplished in 8 weeks:   1. Patient will be independent in HEP performance for maintenance of pelvic floor program and increased quality of life. Status at Progress note: good understanding 12-7-22     2.  Patient will have increased PFM motor performance by >/= 1/2 to 1 grade for improvement in function and increased quality of life. Status at Progress note: 2/5; no significant change 12-7-22     3. Patient will have decreased fecal incontinence episodes to < or =4x/month for more normal function. Status at Progress note: more than 1x/week; no significant change 12-7-22     4.  Patient will have Bowel Leakage FOTO score change of 11 points indicating  Status at Progress note: initial assessment: 48; no significant change 12-7-22       PLAN  [x]  Upgrade activities as tolerated     [x]  Continue plan of care  []  Update interventions per flow sheet       []  Discharge due to:_  []  Other:_      Ruben Payne 12/12/2022  7:43 AM    Future Appointments   Date Time Provider Zoraida Marsh   12/12/2022  9:00 AM Martin Lau [facility]   12/14/2022  9:00 AM Debo MYLES [facility]   12/19/2022  9:00 AM Terry Veliz [facility]   12/21/2022 11:00 AM Sangeeta Veliz [facility]

## 2022-12-14 ENCOUNTER — HOSPITAL ENCOUNTER (OUTPATIENT)
Dept: PHYSICAL THERAPY | Age: 37
Discharge: HOME | End: 2022-12-14
Payer: OTHER GOVERNMENT

## 2022-12-14 PROCEDURE — 97112 NEUROMUSCULAR REEDUCATION: CPT

## 2022-12-14 PROCEDURE — 97110 THERAPEUTIC EXERCISES: CPT

## 2022-12-14 PROCEDURE — 97014 ELECTRIC STIMULATION THERAPY: CPT

## 2022-12-14 NOTE — PROGRESS NOTES
PF DAILY TREATMENT NOTE 3-16    Patient Name: Doug Reveal  Date:2022  : 1985  [x]  Patient  Verified  Payor: MICHAEL / Plan: Saul Beck 72 / Product Type:  /    In time: 9:03  Out time: 10:00  Total Treatment Time (min): 62  Visit #: 2 of 8    Treatment Area: [x] Pelvic Floor     [] Other:    SUBJECTIVE  Pain Level (0-10 scale): 0/10  Any medication changes, allergies to medications, adverse drug reactions, diagnosis change, or new procedure performed?: [x] No    [] Yes (see summary sheet for update)  Subjective functional status/changes:   [] No changes reported  Pt reports being bloating and has some difficulty with voiding bowel during the last 2 days.  He would like to discuss NMES use with MD.    OBJECTIVE    Modality rationale: increase tissue extensibility and increase muscle contraction/control to improve the patients ability to improve leakage   Min Type Additional Details   15 with set up time [] Estim:  []Unatt       []IFC  []Premod                        [x]Other:  High voltage  []w/ice   [x]w/heat  Position: prone  Location: PFM    [] Estim: []Att    []TENS instruct  []NMES                    []Other:  []w/US   []w/ice   []w/heat  Position:  Location:    []  Ultrasound: []Continuous   [] Pulsed                           []1MHz   []3MHz Position:  Location:    []  Ice     []  heat  []  Ice massage  []  Laser   []  Anodyne Position:  Location:   [x] Skin assessment post-treatment:  [x]intact []redness- no adverse reaction    []redness - adverse reaction:         23 min Therapeutic Exercise:  [] See flow sheet :   [x]  Pelvic floor strengthening                 []  Pelvic floor downtraining  []  Quality pelvic floor contractions       [x]  Relaxation techniques  []  Urge suppression exercises  [x]  Other: core therex  Rationale: Increase pelvic floor muscle strength, Improve quality of pelvic floor contractions, Decrease resting tone of the pelvic floor, Increase tissue extensibility of the pelvic floor muscles, Increase core strength, Inhibit abnormal muscle activity, and Improve lumbosacral and coccygeal mobility in order to Increase fecal continence, Decrease bowel urgency, Improve frequency and ease of bowel movements, and Improve ability to perform ADLs. 19 min Neuromuscular Re-education:  []  See flow sheet :   [x]  Pelvic floor strengthening                 []  Pelvic floor downtraining  []  Quality pelvic floor contractions       [x]  Relaxation techniques  []  Urge suppression exercises  [x]  Other: core therex  Rationale: Increase pelvic floor muscle strength, Improve quality of pelvic floor contractions, Decrease resting tone of the pelvic floor, Increase tissue extensibility of the pelvic floor muscles, Increase core strength, Inhibit abnormal muscle activity, and Improve lumbosacral and coccygeal mobility in order to Increase fecal continence, Decrease bowel urgency, Improve frequency and ease of bowel movements, and Improve ability to perform ADLs            With   [] TE   [] TA   [] neuro  [] manual   [] other: Patient Education: [x] Review HEP    [] Progressed/Changed HEP based on:   [] positioning   [] body mechanics   [] transfers   [] heat/ice application    [] other:      Other Objective/Functional Measures:   []baseline resting tone:   []slow twitch mms   []fast twitch mms    Pain Level (0-10 scale) post treatment: 0/10    ASSESSMENT/Changes in Function: Pt reports feeling more ROM with PFM contraction using Oov device. He was mod sore with therex today, min trunk rot with Oov core therex. Pt's pleased with ESTIM/High voltage for pain/PFM relaxation. Will progress as tolerated.     []  Decrease # of leaks   [] No change []  Improving [] Resolved     []  Decrease hypertonus [] No change []  Improving [] Resolved     []  Increase void interval [] No change []  Improving [] Resolved     []  Increase PF strength [] No change []  Improving [] Resolved     [] Increase PF endurance [] No change []  Improving [] Resolved     []  Increase endurance [] No change []  Improving [] Resolved     []  Decrease # of pads [] No change []  Improving [] Resolved     []  Decrease pain [] No change []  Improving [] Resolved     []  Increased coordination [] No change []  Improving [] Resolved     []  Increased Bowel Frequency [] No change []  Improving [] Resolved       Patient will continue to benefit from skilled PT services to modify and progress therapeutic interventions, address functional mobility deficits, address ROM deficits, address strength deficits, analyze and address soft tissue restrictions, analyze and cue movement patterns, analyze and modify body mechanics/ergonomics, assess and modify postural abnormalities, address imbalance/dizziness, and instruct in home and community integration to attain remaining goals. []  See Plan of Care  [x]  See progress note/recertification  []  See Discharge Summary         Progress towards goals / Updated goals:    Progress towards goals / Updated goals:  Short Term Goals: To be accomplished in 4 weeks:   1. Patient will demonstrate accurate performance of home exercise program as adjunct to PT clinic visits. Status at Progress note: good understanding 11-14-22     2. Patient will have decreased fecal incontinence episodes to < or =3x/week for more normal function. Status at Progress note: more than 1x/week; no significant change 12-7-22     3. Patient will report at least 50% improvement with ease for voiding bowel to improve his QOL. Status at Progress note: difficulty with emptying; improving gradually 12-7-22        Long Term Goals: To be accomplished in 8 weeks:   1. Patient will be independent in HEP performance for maintenance of pelvic floor program and increased quality of life. Status at Progress note: good understanding 12-7-22     2.  Patient will have increased PFM motor performance by >/= 1/2 to 1 grade for improvement in function and increased quality of life. Status at Progress note: 2/5; no significant change 12-7-22     3. Patient will have decreased fecal incontinence episodes to < or =4x/month for more normal function. Status at Progress note: more than 1x/week; no significant change 12-7-22     4.  Patient will have Bowel Leakage FOTO score change of 11 points indicating  Status at Progress note: initial assessment: 48; no significant change 12-7-22       PLAN  [x]  Upgrade activities as tolerated     [x]  Continue plan of care  []  Update interventions per flow sheet       []  Discharge due to:_  []  Other:_      Sandee Campo 12/14/2022  7:44 AM    Future Appointments   Date Time Provider Zoraida Marsh   12/14/2022  9:00 AM Michael Pump [facility]   12/19/2022  9:00 AM Henry Pantoja [facility]   12/21/2022 11:00 AM Michael Pump [facility]

## 2022-12-19 ENCOUNTER — HOSPITAL ENCOUNTER (OUTPATIENT)
Dept: PHYSICAL THERAPY | Age: 37
End: 2022-12-19
Payer: OTHER GOVERNMENT

## 2022-12-21 ENCOUNTER — APPOINTMENT (OUTPATIENT)
Dept: PHYSICAL THERAPY | Age: 37
End: 2022-12-21
Payer: OTHER GOVERNMENT

## 2023-01-04 ENCOUNTER — HOSPITAL ENCOUNTER (OUTPATIENT)
Dept: PHYSICAL THERAPY | Age: 38
Discharge: HOME | End: 2023-01-04
Payer: OTHER GOVERNMENT

## 2023-01-04 PROCEDURE — 97530 THERAPEUTIC ACTIVITIES: CPT

## 2023-01-04 PROCEDURE — 97110 THERAPEUTIC EXERCISES: CPT

## 2023-01-04 NOTE — PROGRESS NOTES
PF DAILY TREATMENT NOTE 3-16    Patient Name: Jasper Henry  Date:2023  : 1985  [x]  Patient  Verified  Payor:  / Plan: [facility]  [facility] REGION / Product Type:  /    In time: 3:01  Out time:3:54  Total Treatment Time (min): 48  Visit #: 3 of 8      Treatment Area: [x] Pelvic Floor     [] Other:    SUBJECTIVE  Pain Level (0-10 scale): 0/10  Any medication changes, allergies to medications, adverse drug reactions, diagnosis change, or new procedure performed?: [x] No    [] Yes (see summary sheet for update)  Subjective functional status/changes:   [] No changes reported  Pt reports fluctuating of his symptoms. He notes more leakage with lifting activities. Fiber helps form the stool but he does have firmed stool sliding out. Pt will follow up with MD at 23. OBJECTIVE    23 min Therapeutic Exercise:  [] See flow sheet :   [x]  Pelvic floor strengthening                 []  Pelvic floor downtraining  []  Quality pelvic floor contractions       []  Relaxation techniques  []  Urge suppression exercises  []  Other:  Rationale: Increase pelvic floor muscle strength, Improve quality of pelvic floor contractions, Decrease resting tone of the pelvic floor, Increase tissue extensibility of the pelvic floor muscles, Increase core strength, Inhibit abnormal muscle activity, and Improve lumbosacral and coccygeal mobility in order to Increase fecal continence, Decrease bowel urgency, Improve frequency and ease of bowel movements, and Improve ability to perform ADLs.       30 min Therapeutic Activity:  []  See flow sheet :    []  Increase Tissue extensibility        []  Assess fiber intake    []  Assess voiding habits  []  Assess bowel habits  []  Other:   Rationale: Increase pelvic floor muscle strength, Improve quality of pelvic floor contractions, Decrease resting tone of the pelvic floor, Increase tissue extensibility of the pelvic floor muscles, Increase core strength, Inhibit abnormal muscle activity, and Improve lumbosacral and coccygeal mobility in order to Increase fecal continence, Decrease bowel urgency, Improve frequency and ease of bowel movements, and Improve ability to perform ADLs. With   [] TE   [] TA   [] neuro  [] manual   [] other: Patient Education: [x] Review HEP    [] Progressed/Changed HEP based on:   [] positioning   [] body mechanics   [] transfers   [] heat/ice application    [] other:      Other Objective/Functional Measures:   []baseline resting tone:   []slow twitch mms   []fast twitch mms    Pain Level (0-10 scale) post treatment: 0/10    ASSESSMENT/Changes in Function: see Progress Note      []  Decrease # of leaks   [] No change []  Improving [] Resolved     []  Decrease hypertonus [] No change []  Improving [] Resolved     []  Increase void interval [] No change []  Improving [] Resolved     []  Increase PF strength [] No change []  Improving [] Resolved     []  Increase PF endurance [] No change []  Improving [] Resolved     []  Increase endurance [] No change []  Improving [] Resolved     []  Decrease # of pads [] No change []  Improving [] Resolved     []  Decrease pain [] No change []  Improving [] Resolved     []  Increased coordination [] No change []  Improving [] Resolved     []  Increased Bowel Frequency [] No change []  Improving [] Resolved       Patient will continue to benefit from skilled PT services to modify and progress therapeutic interventions, address functional mobility deficits, address ROM deficits, address strength deficits, analyze and address soft tissue restrictions, analyze and cue movement patterns, analyze and modify body mechanics/ergonomics, assess and modify postural abnormalities, address imbalance/dizziness, and instruct in home and community integration to attain remaining goals.      []  See Plan of Care  [x]  See progress note/recertification  []  See Discharge Summary         Progress towards goals / Updated goals:  Short Term Goals: To be accomplished in 4 weeks:   1. Patient will demonstrate accurate performance of home exercise program as adjunct to PT clinic visits. Status at Progress note: good understanding 11-14-22  Current: good compliance 1-4-23     2. Patient will have decreased fecal incontinence episodes to < or =3x/week for more normal function. Status at Progress note: more than 1x/week; no significant change 12-7-22  Current: improving control minimally 1-4-23    3. Patient will report at least 50% improvement with ease for voiding bowel to improve his QOL. Status at Progress note: difficulty with emptying; improving gradually 12-7-22  Current: improving gradually 1-4-23     Long Term Goals: To be accomplished in 8 weeks:   1. Patient will be independent in HEP performance for maintenance of pelvic floor program and increased quality of life. Status at Progress note: good understanding 12-7-22  Current: good compliance 1-4-23    2. Patient will have increased PFM motor performance by >/= 1/2 to 1 grade for improvement in function and increased quality of life. Status at Progress note: 2/5; no significant change 12-7-22  Current: met 3-/5  1-4-23    3. Patient will have decreased fecal incontinence episodes to < or =4x/month for more normal function. Status at Progress note: more than 1x/week; no significant change 12-7-22  Current: improving control minimally 1-4-23    4.  Patient will have Bowel Leakage FOTO score change of 11 points indicating  Status at Progress note: initial assessment: 48; no significant change 12-7-22  Current: progressing gradually, improve 6 points 1-4-23      PLAN  [x]  Upgrade activities as tolerated     [x]  Continue plan of care  []  Update interventions per flow sheet       []  Discharge due to:_  []  Other:_      Juany Johnson 1/4/2023  10:12 AM    Future Appointments   Date Time Provider Zoraida Marsh   1/4/2023  1:00 PM Amelia [facility] 1/11/2023  1:00 PM Van Estrada [facility]   1/17/2023  4:30 PM Van Estrada [facility]   1/20/2023  9:00 AM Van Estrada [facility]   1/25/2023 12:00 PM Van Estrada [facility]   1/30/2023 12:00 PM Van Estrada [facility]

## 2023-01-04 NOTE — PROGRESS NOTES
[facility] - Gloria Sosa  [address] [facility]  [phone] fax    Pelvic Floor Progress Note  Patient name: Jorden Kirkland Start of Care: 2022   Referral source: Fito Marmolejo MD : 1985               Medical Diagnosis: Fecal incontinence [R15.9]  Payor:  / Plan: [facility] 72 / Product Type:  /  Onset Date:2022               Treatment Diagnosis: bowel incontinence, PFD, difficulty voiding   Prior Hospitalization: see medical history Provider#: [id number]   Medications: Verified on Patient summary List    Comorbidities: history of malignant carcinoid tumor, nissen fundoplication, colon resection, chemo port placement, ileostomy reversal   Prior Level of Function: ind with all mobility, no leakage        Visits from Start of Care: 8    Missed Visits: 0      Established Goals:           Excellent Good         Limited           None  [x] Increase Pelvic MM strength []  [x]  []  []  [x] Decrease Pelvic MM hypertonus []  [x]  []  []  [x] Decrease Incontinence Episodes []  []  [x]  []   [x] Improve Voiding Habits  []  []  [x]  []  [] Decreased Urgency   []  []  []  []      Key Functional Changes: improving strength & endurance of PFM, improving control with fecal leakage    Updated Goals: to be achieved in 4 weeks:  1. Patient will demonstrate accurate performance of home exercise program as adjunct to PT clinic visits. Status at Progress note: good compliance 23     2. Patient will have decreased fecal incontinence episodes to < or =3x/week for more normal function. Status at Progress note: more than 1x/week; improving control minimally 23     3. Patient will report at least 50% improvement with ease for voiding bowel to improve his QOL. Status at Progress note: difficulty with emptying; improving gradually 23     2.  Patient will have increased PFM motor performance by >/= 1/2 to 1 grade for improvement in function and increased quality of life. Status at Progress note: 3-/5  1-4-23     4. Patient will have Bowel Leakage FOTO score change of 11 points indicating  Status at Progress note: initial assessment: 48; progressing gradually, improve 6 points 1-4-23       ASSESSMENT/RECOMMENDATIONS: Pt's making gradual progress with Pelvic floor PT. He reports no change with leakage frequency but feels a little more control, he's able to hold the stool back instead of just having everything slide/leakage out completely. PFM strength improves to 3-/5, endurance improves to 21 seconds. Pt reports significant  He still struggles with differentiate between gas & fecal matter if he's not keeping up with fiber for regular voiding. Pt would cont to benefit from skilled Pelvic Floor PT to Increase pelvic floor muscle strength, Improve quality of pelvic floor contractions, Decrease resting tone of the pelvic floor, Increase tissue extensibility of the pelvic floor muscles, Increase core strength, Inhibit abnormal muscle activity, and Improve lumbosacral and coccygeal mobility in order to Increase fecal continence, Decrease bowel urgency, Improve frequency and ease of bowel movements, and Improve ability to perform ADLs.        [x]Continue therapy per initial plan/protocol at a frequency of  1-2 x per week for 4 weeks  []Continue therapy with the following recommended changes:_____________________      _____________________________________________________________________  []Discontinue therapy progressing towards or have reached established goals  []Discontinue therapy due to lack of appreciable progress towards goals  []Discontinue therapy due to lack of attendance or compliance  []Await Physician's recommendations/decisions regarding therapy  []Other:________________________________________________________________    Thank you for this referral.   Nacho Toledo 1/4/2023 10:13 AM  NOTE TO PHYSICIAN:  PLEASE COMPLETE THE ORDERS BELOW AND   FAX TO [facility]: [phone]  If you are unable to process this request in 24 hours please contact our office: [phone]    I have read the above report and request that my patient continue as recommended. I have read the above report and request that my patient continue therapy with the following changes/special instructions:___________________________________________________________  I have read the above report and request that my patient be discharged from therapy.     Physician's Signature:____________Date:_________TIME:________     Nilton Bartholomew MD  ** Signature, Date and Time must be completed for valid certification **

## 2023-01-11 ENCOUNTER — HOSPITAL ENCOUNTER (OUTPATIENT)
Dept: PHYSICAL THERAPY | Age: 38
Discharge: HOME | End: 2023-01-11
Payer: OTHER GOVERNMENT

## 2023-01-11 PROCEDURE — 97112 NEUROMUSCULAR REEDUCATION: CPT

## 2023-01-11 PROCEDURE — 97110 THERAPEUTIC EXERCISES: CPT

## 2023-01-11 PROCEDURE — 97530 THERAPEUTIC ACTIVITIES: CPT

## 2023-01-11 PROCEDURE — 97014 ELECTRIC STIMULATION THERAPY: CPT

## 2023-01-11 NOTE — PROGRESS NOTES
PF DAILY TREATMENT NOTE 3-16    Patient Name: Evelyn Saunders  Date:2023  : 1985  [x]  Patient  Verified  Payor:  / Plan: [facility] REGION / Product Type:  /    In time: 1:04  Out time: 2:03  Total Treatment Time (min): 61  Visit #: 1 of 8    Treatment Area: [x] Pelvic Floor     [] Other:    SUBJECTIVE  Pain Level (0-10 scale): 0/10  Any medication changes, allergies to medications, adverse drug reactions, diagnosis change, or new procedure performed?: [x] No    [] Yes (see summary sheet for update)  Subjective functional status/changes:   [] No changes reported  Pt reports feeling like it's a little slower to void his bowel during the last several days. No other problem. He's able to maintain PFM exercises within 50-80 reps every other day.        OBJECTIVE    Modality rationale: decrease pain and increase tissue extensibility to improve the patients ability to tolerate ADLs & improve leakage   Min Type Additional Details   15 [] Estim:  [x]Unatt       []IFC  []Premod                        [x]Other: HV  []w/ice   [x]w/heat  Position: prone  Location: PFM    [] Estim: []Att    []TENS instruct  []NMES                    []Other:  []w/US   []w/ice   []w/heat  Position:  Location:    []  Ultrasound: []Continuous   [] Pulsed                           []1MHz   []3MHz Position:  Location:    []  Ice     []  heat  []  Ice massage  []  Laser   []  Anodyne Position:  Location:   [x] Skin assessment post-treatment:  [x]intact []redness- no adverse reaction    []redness - adverse reaction:       25 min Therapeutic Exercise:  [x] See flow sheet :   [x]  Pelvic floor strengthening                 []  Pelvic floor downtraining  []  Quality pelvic floor contractions       [x]  Relaxation techniques  []  Urge suppression exercises  []  Other:  Rationale: Increase pelvic floor muscle strength, Improve quality of pelvic floor contractions, Decrease resting tone of the pelvic floor, Increase tissue extensibility of the pelvic floor muscles, Increase core strength, Inhibit abnormal muscle activity, and Improve lumbosacral and coccygeal mobility in order to Increase fecal continence, Decrease bowel urgency, Improve frequency and ease of bowel movements, and Improve ability to perform ADLs. 10 min Therapeutic Activity:  []  See flow sheet :    []  Increase Tissue extensibility        []  Assess fiber intake    [x]  Assess voiding habits  [x]  Assess bowel habits  [x]  Other: tips to improve ease with voiding    Rationale: Increase pelvic floor muscle strength, Improve quality of pelvic floor contractions, Decrease resting tone of the pelvic floor, Increase tissue extensibility of the pelvic floor muscles, Increase core strength, Inhibit abnormal muscle activity, and Improve lumbosacral and coccygeal mobility in order to Increase fecal continence, Decrease bowel urgency, Improve frequency and ease of bowel movements, and Improve ability to perform ADLs. 9 min Neuromuscular Re-education:  [x]  See flow sheet :   [x]  Pelvic floor strengthening                 []  Pelvic floor downtraining  []  Quality pelvic floor contractions       [x]  Relaxation techniques  []  Urge suppression exercises  []  Other:  Rationale: Increase pelvic floor muscle strength, Improve quality of pelvic floor contractions, Decrease resting tone of the pelvic floor, Increase tissue extensibility of the pelvic floor muscles, Increase core strength, Inhibit abnormal muscle activity, and Improve lumbosacral and coccygeal mobility in order to Increase fecal continence, Decrease bowel urgency, Improve frequency and ease of bowel movements, and Improve ability to perform ADLs.       With   [] TE   [] TA   [] neuro  [] manual   [] other: Patient Education: [x] Review HEP    [] Progressed/Changed HEP based on:   [] positioning   [] body mechanics   [] transfers   [] heat/ice application    [] other:      Other Objective/Functional Measures: []baseline resting tone:   []slow twitch mms   []fast twitch mms   In standing: taking 8 sec to fully contract, decreased strength after 12 sec hold       Pain Level (0-10 scale) post treatment: 0/10    ASSESSMENT/Changes in Function: focussed on more core strengthening therex, pt demonstrates fairly good tolerance. Mod challenge with 25 sec hold for PFM. Will progress as tolerated. []  Decrease # of leaks   [] No change []  Improving [] Resolved     []  Decrease hypertonus [] No change []  Improving [] Resolved     []  Increase void interval [] No change []  Improving [] Resolved     []  Increase PF strength [] No change []  Improving [] Resolved     []  Increase PF endurance [] No change []  Improving [] Resolved     []  Increase endurance [] No change []  Improving [] Resolved     []  Decrease # of pads [] No change []  Improving [] Resolved     []  Decrease pain [] No change []  Improving [] Resolved     []  Increased coordination [] No change []  Improving [] Resolved     []  Increased Bowel Frequency [] No change []  Improving [] Resolved       Patient will continue to benefit from skilled PT services to modify and progress therapeutic interventions, address functional mobility deficits, address ROM deficits, address strength deficits, analyze and address soft tissue restrictions, analyze and cue movement patterns, analyze and modify body mechanics/ergonomics, assess and modify postural abnormalities, address imbalance/dizziness, and instruct in home and community integration to attain remaining goals. []  See Plan of Care  [x]  See progress note/recertification  []  See Discharge Summary         Progress towards goals / Updated goals:  Updated Goals: to be achieved in 4 weeks:  1. Patient will demonstrate accurate performance of home exercise program as adjunct to PT clinic visits. Status at Progress note: good compliance 1-4-23     2.  Patient will have decreased fecal incontinence episodes to < or =3x/week for more normal function. Status at Progress note: more than 1x/week; improving control minimally 1-4-23     3. Patient will report at least 50% improvement with ease for voiding bowel to improve his QOL. Status at Progress note: difficulty with emptying; improving gradually 1-4-23     2. Patient will have increased PFM motor performance by >/= 1/2 to 1 grade for improvement in function and increased quality of life. Status at Progress note: 3-/5  1-4-23     4.  Patient will have Bowel Leakage FOTO score change of 11 points indicating  Status at Progress note: initial assessment: 48; progressing gradually, improve 6 points 1-4-23      PLAN  [x]  Upgrade activities as tolerated     [x]  Continue plan of care  []  Update interventions per flow sheet       []  Discharge due to:_  []  Other:_      Yao Sood 1/11/2023  10:18 AM    Future Appointments   Date Time Provider Zoraida Marsh   1/11/2023  1:00 PM Frances Adkins [id number]   1/17/2023  4:30 PM Beni MYLES [id number]   1/20/2023  9:00 AM Terry Nuno [facility]   1/25/2023 12:00 PM Frances Adkins [facility]   1/30/2023 12:00 PM Frances Adkins [facility]

## 2023-01-17 ENCOUNTER — HOSPITAL ENCOUNTER (OUTPATIENT)
Dept: PHYSICAL THERAPY | Age: 38
Discharge: HOME | End: 2023-01-17
Payer: OTHER GOVERNMENT

## 2023-01-17 PROCEDURE — 97014 ELECTRIC STIMULATION THERAPY: CPT

## 2023-01-17 PROCEDURE — 97110 THERAPEUTIC EXERCISES: CPT

## 2023-01-17 PROCEDURE — 97530 THERAPEUTIC ACTIVITIES: CPT

## 2023-01-17 NOTE — PROGRESS NOTES
PF DAILY TREATMENT NOTE 3-16    Patient Name: Miguel Rendon  Date:2023  : 1985  [x]  Patient  Verified  Payor: MICHAEL / Plan: Saul Beck 72 / Product Type:  /    In time: 4:38  Out time: 5:35  Total Treatment Time (min): 62  Visit #: 2 of 8    Treatment Area: [x] Pelvic Floor     [] Other:    SUBJECTIVE  Pain Level (0-10 scale): 0/10  Any medication changes, allergies to medications, adverse drug reactions, diagnosis change, or new procedure performed?: [x] No    [] Yes (see summary sheet for update)  Subjective functional status/changes:   [] No changes reported  Pt reports having TENS unit & using it for overall pain. Leakage improves a little in the morning. He still experiences some soreness with voiding bowel. He skips BM 1 day sometimes. On the next days, he has 4-6 BMs with good amount & fairly good consistency. He thinks what the PFM need to do during BM makes them sore afterwards. OBJECTIVE    Modality rationale: decrease pain, increase tissue extensibility, and increase muscle contraction/control to improve the patients ability to improve fecal incont.    Min Type Additional Details   15 [x] Estim:  [x]Unatt       []IFC  []Premod                        [x]Other: HV  []w/ice   [x]w/heat  Position: prone  Location: PFM    [] Estim: []Att    []TENS instruct  []NMES                    []Other:  []w/US   []w/ice   []w/heat  Position:  Location:    []  Ultrasound: []Continuous   [] Pulsed                           []1MHz   []3MHz Position:  Location:    []  Ice     []  heat  []  Ice massage  []  Laser   []  Anodyne Position:  Location:   [x] Skin assessment post-treatment:  [x]intact []redness- no adverse reaction    []redness - adverse reaction:         34 min Therapeutic Exercise:  [] See flow sheet :   [x]  Pelvic floor strengthening                 []  Pelvic floor downtraining  []  Quality pelvic floor contractions       [x]  Relaxation techniques  []  Urge suppression exercises  Rationale: Increase pelvic floor muscle strength, Improve quality of pelvic floor contractions, Decrease resting tone of the pelvic floor, Increase tissue extensibility of the pelvic floor muscles, Increase core strength, Inhibit abnormal muscle activity, and Improve lumbosacral and coccygeal mobility in order to Increase fecal continence and Improve ability to perform ADLs. 8 min Therapeutic Activity:  [x]  See flow sheet :    []  Increase Tissue extensibility        [x]  Assess fiber intake    [x]  Assess voiding habits  [x]  Assess bowel habits  []  Other:   Rationale: Increase pelvic floor muscle strength, Improve quality of pelvic floor contractions, Decrease resting tone of the pelvic floor, Increase tissue extensibility of the pelvic floor muscles, Increase core strength, Inhibit abnormal muscle activity, and Improve lumbosacral and coccygeal mobility in order to Increase fecal continence and Improve ability to perform ADLs. With   [] TE   [] TA   [] neuro  [] manual   [] other: Patient Education: [x] Review HEP    [] Progressed/Changed HEP based on:   [] positioning   [] body mechanics   [] transfers   [] heat/ice application    [] other:      Other Objective/Functional Measures:   []baseline resting tone:   []slow twitch mms   []fast twitch mms   Near cramping with 1st reps lf 25 sec hold in supine     Pain Level (0-10 scale) post treatment: 0/10    ASSESSMENT/Changes in Function: Pt reports min improvement of leakage in the morning. He still has hard time with fully isabel PFM quickly (taking ~10 sec for the drawing in to happen). Recommended to initiate Thiago's massage to improve rectal sphincter & levator ani mobility for voiding ease & tolerance. Will progress as tolerated.      []  Decrease # of leaks   [] No change []  Improving [] Resolved     []  Decrease hypertonus [] No change []  Improving [] Resolved     []  Increase void interval [] No change []  Improving [] Resolved     []  Increase PF strength [] No change []  Improving [] Resolved     []  Increase PF endurance [] No change []  Improving [] Resolved     []  Increase endurance [] No change []  Improving [] Resolved     []  Decrease # of pads [] No change []  Improving [] Resolved     []  Decrease pain [] No change []  Improving [] Resolved     []  Increased coordination [] No change []  Improving [] Resolved     []  Increased Bowel Frequency [] No change []  Improving [] Resolved       Patient will continue to benefit from skilled PT services to modify and progress therapeutic interventions, address functional mobility deficits, address ROM deficits, address strength deficits, analyze and address soft tissue restrictions, analyze and cue movement patterns, analyze and modify body mechanics/ergonomics, assess and modify postural abnormalities, address imbalance/dizziness, and instruct in home and community integration to attain remaining goals. []  See Plan of Care  [x]  See progress note/recertification  []  See Discharge Summary         Progress towards goals / Updated goals:  Updated Goals: to be achieved in 4 weeks:  1. Patient will demonstrate accurate performance of home exercise program as adjunct to PT clinic visits. Status at Progress note: good compliance 1-4-23     2. Patient will have decreased fecal incontinence episodes to < or =3x/week for more normal function. Status at Progress note: more than 1x/week; improving control minimally 1-4-23  Current: min improvement of amount/intensity of leakage 1-17-23     3. Patient will report at least 50% improvement with ease for voiding bowel to improve his QOL. Status at Progress note: difficulty with emptying; improving gradually 1-4-23     2. Patient will have increased PFM motor performance by >/= 1/2 to 1 grade for improvement in function and increased quality of life. Status at Progress note: 3-/5  1-4-23     4.  Patient will have Bowel Leakage FOTO score change of 11 points indicating  Status at Progress note: initial assessment: 48; progressing gradually, improve 6 points 1-4-23       PLAN  [x]  Upgrade activities as tolerated     [x]  Continue plan of care  []  Update interventions per flow sheet       []  Discharge due to:_  []  Other:_      Jermaine Nowak 1/17/2023  9:29 AM    Future Appointments   Date Time Provider Zoraida Marsh   1/17/2023  4:30 PM Anya De Jesus [id number] [facility]   1/20/2023  9:00 AM Terry Cruz [facility]   1/25/2023 12:00 PM Anya De Jesus [id number]   1/30/2023 12:00 PM Anya De Jesus [id number]

## 2023-01-20 ENCOUNTER — HOSPITAL ENCOUNTER (OUTPATIENT)
Dept: PHYSICAL THERAPY | Age: 38
Discharge: HOME | End: 2023-01-20
Payer: OTHER GOVERNMENT

## 2023-01-20 PROCEDURE — 97110 THERAPEUTIC EXERCISES: CPT

## 2023-01-20 PROCEDURE — 97112 NEUROMUSCULAR REEDUCATION: CPT

## 2023-01-20 PROCEDURE — 97530 THERAPEUTIC ACTIVITIES: CPT

## 2023-01-20 PROCEDURE — 97014 ELECTRIC STIMULATION THERAPY: CPT

## 2023-01-20 NOTE — PROGRESS NOTES
PF DAILY TREATMENT NOTE 3-16    Patient Name: Darlene Vasques  Date:2023  : 1985  [x]  Patient  Verified  Payor:  / Plan: Saul Beck 72 / Product Type:  /    In time: 9:05  Out time: 10:05  Total Treatment Time (min): 60  Visit #: 3 of 8    Treatment Area: [x] Pelvic Floor     [] Other:    SUBJECTIVE  Pain Level (0-10 scale): 0/10  Any medication changes, allergies to medications, adverse drug reactions, diagnosis change, or new procedure performed?: [x] No    [] Yes (see summary sheet for update)  Subjective functional status/changes:   [] No changes reported  Pt follow up with referring MD and obtained new script to cont PT. Pt feels a little tense today with his pelvic floor.        OBJECTIVE    Modality rationale: decrease pain and increase tissue extensibility to improve the patients ability to tolerate ADLs/amb    Min Type Additional Details   10 [x] Estim:  [x]Unatt       []IFC  []Premod                        [x]Other:  []w/ice   [x]w/heat  Position: prone  Location: PFM    [] Estim: []Att    []TENS instruct  []NMES                    []Other:  []w/US   []w/ice   []w/heat  Position:  Location:    []  Ultrasound: []Continuous   [] Pulsed                           []1MHz   []3MHz Position:  Location:    []  Ice     []  heat  []  Ice massage  []  Laser   []  Anodyne Position:  Location:   [x] Skin assessment post-treatment:  [x]intact []redness- no adverse reaction    []redness - adverse reaction:         30 min Therapeutic Exercise:  [] See flow sheet :   []  Pelvic floor strengthening                 [x]  Pelvic floor downtraining  []  Quality pelvic floor contractions       [x]  Relaxation techniques  []  Urge suppression exercises  []  Other:  Rationale: Decrease resting tone of the pelvic floor, Increase tissue extensibility of the pelvic floor muscles, Inhibit abnormal muscle activity, and Improve lumbosacral and coccygeal mobility in order to Increase fecal continence, Improve frequency and ease of bowel movements, and Improve ability to perform ADLs. 10 min Therapeutic Activity:  []  See flow sheet :    []  Increase Tissue extensibility        []  Assess fiber intake    [x]  Assess voiding habits  [x]  Assess bowel habits  []  Other:   Rationale: Decrease resting tone of the pelvic floor, Increase tissue extensibility of the pelvic floor muscles, Inhibit abnormal muscle activity, and Improve lumbosacral and coccygeal mobility in order to Increase fecal continence, Improve frequency and ease of bowel movements, and Improve ability to perform ADLs. 10 min Neuromuscular Re-education:  [x]  See flow sheet :   []  Pelvic floor strengthening                 []  Pelvic floor downtraining  []  Quality pelvic floor contractions       [x]  Relaxation techniques  []  Urge suppression exercises  []  Other:  Rationale: Decrease resting tone of the pelvic floor, Increase tissue extensibility of the pelvic floor muscles, Inhibit abnormal muscle activity, and Improve lumbosacral and coccygeal mobility in order to Increase fecal continence, Improve frequency and ease of bowel movements, and Improve ability to perform ADLs. With   [] TE   [] TA   [] neuro  [] manual   [] other: Patient Education: [x] Review HEP    [] Progressed/Changed HEP based on:   [] positioning   [] body mechanics   [] transfers   [] heat/ice application    [] other:      Other Objective/Functional Measures:   []baseline resting tone:   []slow twitch mms   []fast twitch mms    Pain Level (0-10 scale) post treatment: 0/10    ASSESSMENT/Changes in Function: Pt's progressing slowly with Pelvic floor PT. He's pleased with all relaxation activities today. Noted more tightness with Right hip. Will progress as tolerated.        []  Decrease # of leaks   [] No change []  Improving [] Resolved     []  Decrease hypertonus [] No change []  Improving [] Resolved     []  Increase void interval [] No change [] Improving [] Resolved     []  Increase PF strength [] No change []  Improving [] Resolved     []  Increase PF endurance [] No change []  Improving [] Resolved     []  Increase endurance [] No change []  Improving [] Resolved     []  Decrease # of pads [] No change []  Improving [] Resolved     []  Decrease pain [] No change []  Improving [] Resolved     []  Increased coordination [] No change []  Improving [] Resolved     []  Increased Bowel Frequency [] No change []  Improving [] Resolved       Patient will continue to benefit from skilled PT services to modify and progress therapeutic interventions, address functional mobility deficits, address ROM deficits, address strength deficits, analyze and address soft tissue restrictions, analyze and cue movement patterns, analyze and modify body mechanics/ergonomics, assess and modify postural abnormalities, address imbalance/dizziness, and instruct in home and community integration to attain remaining goals. []  See Plan of Care  [x]  See progress note/recertification  []  See Discharge Summary           Progress towards goals / Updated goals:  Updated Goals: to be achieved in 4 weeks:  1. Patient will demonstrate accurate performance of home exercise program as adjunct to PT clinic visits. Status at Progress note: good compliance 1-4-23     2. Patient will have decreased fecal incontinence episodes to < or =3x/week for more normal function. Status at Progress note: more than 1x/week; improving control minimally 1-4-23  Current: min improvement of amount/intensity of leakage 1-17-23     3. Patient will report at least 50% improvement with ease for voiding bowel to improve his QOL. Status at Progress note: difficulty with emptying; improving gradually 1-4-23     2. Patient will have increased PFM motor performance by >/= 1/2 to 1 grade for improvement in function and increased quality of life. Status at Progress note: 3-/5  1-4-23     4.  Patient will have Bowel Leakage FOTO score change of 11 points indicating  Status at Progress note: initial assessment: 48; progressing gradually, improve 6 points 1-4-23      PLAN  [x]  Upgrade activities as tolerated     [x]  Continue plan of care  []  Update interventions per flow sheet       []  Discharge due to:_  []  Other:_      Gissell Vazquez 1/20/2023  7:43 AM    Future Appointments   Date Time Provider Zoraida Marsh   1/20/2023  9:00 AM [id number]   1/25/2023 12:00 PM [facility] [id number] [facility]   1/30/2023 12:00 PM [id number]

## 2023-01-25 ENCOUNTER — HOSPITAL ENCOUNTER (OUTPATIENT)
Dept: PHYSICAL THERAPY | Age: 38
Discharge: HOME | End: 2023-01-25
Payer: OTHER GOVERNMENT

## 2023-01-25 PROCEDURE — 97110 THERAPEUTIC EXERCISES: CPT

## 2023-01-25 PROCEDURE — 97014 ELECTRIC STIMULATION THERAPY: CPT

## 2023-01-25 PROCEDURE — 97530 THERAPEUTIC ACTIVITIES: CPT

## 2023-01-25 PROCEDURE — 97112 NEUROMUSCULAR REEDUCATION: CPT

## 2023-01-25 NOTE — PROGRESS NOTES
PF DAILY TREATMENT NOTE 3-16    Patient Name: Holly Acosta  Date:2023  : 1985  [x]  Patient  Verified  Payor:  / Plan: [facility] / Product Type:  /    In time: 12:07  Out time: 12:59  Total Treatment Time (min): 52  Visit #: 4 of 8    Treatment Area: [x] Pelvic Floor     [] Other:    SUBJECTIVE  Pain Level (0-10 scale): 0/10  Any medication changes, allergies to medications, adverse drug reactions, diagnosis change, or new procedure performed?: [x] No    [] Yes (see summary sheet for update)  Subjective functional status/changes:   [] No changes reported  Pt reports doing ok so far.      OBJECTIVE    Modality rationale: decrease pain and increase tissue extensibility to improve the patients ability to tolerate ADLs, improve leakage   Min Type Additional Details   10 [x] Estim:  [x]Unatt       []IFC  []Premod                        [x]Other:  HV []w/ice   [x]w/heat  Position: prone  Location: PFM    [] Estim: []Att    []TENS instruct  []NMES                    []Other:  []w/US   []w/ice   []w/heat  Position:  Location:    []  Ultrasound: []Continuous   [] Pulsed                           []1MHz   []3MHz Position:  Location:    []  Ice     []  heat  []  Ice massage  []  Laser   []  Anodyne Position:  Location:   [x] Skin assessment post-treatment:  [x]intact []redness- no adverse reaction    []redness - adverse reaction:         23 min Therapeutic Exercise:  [x] See flow sheet :   [x]  Pelvic floor strengthening                 []  Pelvic floor downtraining  []  Quality pelvic floor contractions       [x]  Relaxation techniques  []  Urge suppression exercises  []  Other:  Rationale: Increase pelvic floor muscle strength, Improve quality of pelvic floor contractions, Decrease resting tone of the pelvic floor, Increase tissue extensibility of the pelvic floor muscles, Increase core strength, Inhibit abnormal muscle activity, and Improve lumbosacral and coccygeal mobility in order to Increase fecal continence, Improve frequency and ease of bowel movements, and Improve ability to perform ADLs. 10 min Therapeutic Activity:  []  See flow sheet :    []  Increase Tissue extensibility        []  Assess fiber intake    [x]  Assess voiding habits  [x]  Assess bowel habits  [x]  Other: NMES    Rationale: Increase pelvic floor muscle strength, Improve quality of pelvic floor contractions, Decrease resting tone of the pelvic floor, Increase tissue extensibility of the pelvic floor muscles, Increase core strength, Inhibit abnormal muscle activity, and Improve lumbosacral and coccygeal mobility in order to Increase fecal continence, Improve frequency and ease of bowel movements, and Improve ability to perform ADLs. 9 min Neuromuscular Re-education:  []  See flow sheet :   [x]  Pelvic floor strengthening                 []  Pelvic floor downtraining  [x]  Quality pelvic floor contractions       [x]  Relaxation techniques  []  Urge suppression exercises  []  Other:  Rationale: Increase pelvic floor muscle strength, Improve quality of pelvic floor contractions, Decrease resting tone of the pelvic floor, Increase tissue extensibility of the pelvic floor muscles, Increase core strength, Inhibit abnormal muscle activity, and Improve lumbosacral and coccygeal mobility in order to Increase fecal continence, Improve frequency and ease of bowel movements, and Improve ability to perform ADLs.               With   [] TE   [] TA   [] neuro  [] manual   [] other: Patient Education: [x] Review HEP    [] Progressed/Changed HEP based on:   [] positioning   [] body mechanics   [] transfers   [] heat/ice application    [] other:      Other Objective/Functional Measures:   []baseline resting tone:   []slow twitch mms   []fast twitch mms   Mod pain & min spasm with max contraction of PFM, core & hip add    Pain Level (0-10 scale) post treatment: 0/10    ASSESSMENT/Changes in Function: Pt reports pain & spasm with max contraction of PFM today. Good relief with TENS. He's opened to try NMES internally and would discuss with MD for script to obtain unit. Will progress as tolerated. []  Decrease # of leaks   [] No change []  Improving [] Resolved     []  Decrease hypertonus [] No change []  Improving [] Resolved     []  Increase void interval [] No change []  Improving [] Resolved     []  Increase PF strength [] No change []  Improving [] Resolved     []  Increase PF endurance [] No change []  Improving [] Resolved     []  Increase endurance [] No change []  Improving [] Resolved     []  Decrease # of pads [] No change []  Improving [] Resolved     []  Decrease pain [] No change []  Improving [] Resolved     []  Increased coordination [] No change []  Improving [] Resolved     []  Increased Bowel Frequency [] No change []  Improving [] Resolved       Patient will continue to benefit from skilled PT services to modify and progress therapeutic interventions, address functional mobility deficits, address ROM deficits, address strength deficits, analyze and address soft tissue restrictions, analyze and cue movement patterns, analyze and modify body mechanics/ergonomics, assess and modify postural abnormalities, address imbalance/dizziness, and instruct in home and community integration to attain remaining goals. []  See Plan of Care  [x]  See progress note/recertification  []  See Discharge Summary         Progress towards goals / Updated goals:  Updated Goals: to be achieved in 4 weeks:  1. Patient will demonstrate accurate performance of home exercise program as adjunct to PT clinic visits. Status at Progress note: good compliance 1-4-23     2. Patient will have decreased fecal incontinence episodes to < or =3x/week for more normal function. Status at Progress note: more than 1x/week; improving control minimally 1-4-23  Current: min improvement of amount/intensity of leakage 1-17-23     3.  Patient will report at least 50% improvement with ease for voiding bowel to improve his QOL. Status at Progress note: difficulty with emptying; improving gradually 1-4-23     2. Patient will have increased PFM motor performance by >/= 1/2 to 1 grade for improvement in function and increased quality of life. Status at Progress note: 3-/5  1-4-23  Current: pain & spasm with max contraction today 1-25-23     4.  Patient will have Bowel Leakage FOTO score change of 11 points indicating  Status at Progress note: initial assessment: 48; progressing gradually, improve 6 points 1-4-23    PLAN  [x]  Upgrade activities as tolerated     [x]  Continue plan of care  []  Update interventions per flow sheet       []  Discharge due to:_  []  Other:_      Janak Mcarthur 1/25/2023  10:16 AM    Future Appointments   Date Time Provider Zoraida Marsh   1/25/2023 12:00 PM Jan Gonzalez [id number] [facility]   1/30/2023 12:00 PM Jan Gonzalez [id number] [facility]

## 2023-01-30 ENCOUNTER — HOSPITAL ENCOUNTER (OUTPATIENT)
Dept: PHYSICAL THERAPY | Age: 38
Discharge: HOME | End: 2023-01-30
Payer: OTHER GOVERNMENT

## 2023-01-30 PROCEDURE — 97014 ELECTRIC STIMULATION THERAPY: CPT

## 2023-01-30 PROCEDURE — 97530 THERAPEUTIC ACTIVITIES: CPT

## 2023-01-30 NOTE — PROGRESS NOTES
PF DAILY TREATMENT NOTE 3-16    Patient Name: Camilla Dumont  Date:2023  : 1985  [x]  Patient  Verified  Payor:  / Plan: Saul Beck 72 / Product Type:  /    In time:12:05  Out time: 1:05  Total Treatment Time (min): 60  Visit #: 5 of 8      Treatment Area: [x] Pelvic Floor     [] Other:    SUBJECTIVE  Pain Level (0-10 scale): Any medication changes, allergies to medications, adverse drug reactions, diagnosis change, or new procedure performed?: [x] No    [] Yes (see summary sheet for update)  Subjective functional status/changes:   [] No changes reported  Pt reports soreness has been persistent. He hasn't done any squeeze but he felt sore with his tailbone too. Pt's more opened to try pelvic wand and will discuss NMES with doctor as needed/tolerated.        OBJECTIVE    Modality rationale: decrease pain and increase tissue extensibility to improve the patients ability to improve fecal incontinence   Min Type Additional Details   15 [x] Estim:  [x]Unatt       []IFC  []Premod                        [x]Other: HV []w/ice   [x]w/heat  Position: prone  Location: PFM    [] Estim: []Att    []TENS instruct  []NMES                    []Other:  []w/US   []w/ice   []w/heat  Position:  Location:    []  Ultrasound: []Continuous   [] Pulsed                           []1MHz   []3MHz Position:  Location:    []  Ice     []  heat  []  Ice massage  []  Laser   []  Anodyne Position:  Location:   [x] Skin assessment post-treatment:  [x]intact []redness- no adverse reaction    []redness - adverse reaction:       45 min Therapeutic Activity:  [x]  See flow sheet :    []  Increase Tissue extensibility        [x]  Assess fiber intake    [x]  Assess voiding habits  [x]  Assess bowel habits  [x]  Other: pelvic wand use, NMES use   Rationale: Increase pelvic floor muscle strength, Improve quality of pelvic floor contractions, Decrease resting tone of the pelvic floor, Increase tissue extensibility of the pelvic floor muscles, Increase core strength, Inhibit abnormal muscle activity, and Improve lumbosacral and coccygeal mobility in order to Increase urinary continence, Decrease bowel urgency, Improve frequency and ease of bowel movements, and Improve ability to perform ADLs. With   [] TE   [] TA   [] neuro  [] manual   [] other: Patient Education: [x] Review HEP    [] Progressed/Changed HEP based on:   [] positioning   [] body mechanics   [] transfers   [] heat/ice application    [] other:      Other Objective/Functional Measures:   []baseline resting tone:   []slow twitch mms   []fast twitch mms    Pain Level (0-10 scale) post treatment: 0/10    ASSESSMENT/Changes in Function: see Progress note        []  Decrease # of leaks   [] No change []  Improving [] Resolved     []  Decrease hypertonus [] No change []  Improving [] Resolved     []  Increase void interval [] No change []  Improving [] Resolved     []  Increase PF strength [] No change []  Improving [] Resolved     []  Increase PF endurance [] No change []  Improving [] Resolved     []  Increase endurance [] No change []  Improving [] Resolved     []  Decrease # of pads [] No change []  Improving [] Resolved     []  Decrease pain [] No change []  Improving [] Resolved     []  Increased coordination [] No change []  Improving [] Resolved     []  Increased Bowel Frequency [] No change []  Improving [] Resolved       Patient will continue to benefit from skilled PT services to modify and progress therapeutic interventions, address functional mobility deficits, address ROM deficits, address strength deficits, analyze and address soft tissue restrictions, analyze and cue movement patterns, analyze and modify body mechanics/ergonomics, assess and modify postural abnormalities, address imbalance/dizziness, and instruct in home and community integration to attain remaining goals.      []  See Plan of Care  [x]  See progress note/recertification  []  See Discharge Summary           Progress towards goals / Updated goals:  Updated Goals: to be achieved in 4 weeks:  1. Patient will demonstrate accurate performance of home exercise program as adjunct to PT clinic visits. Status at Progress note: good compliance 1-4-23  Current: good compliance, focusing more on relaxation 1-30-23     2. Patient will have decreased fecal incontinence episodes to < or =3x/week for more normal function. Status at Progress note: more than 1x/week; improving control minimally 1-4-23  Current: min improvement of amount/intensity of leakage 1-17-23; limited progression 1-30-23     3. Patient will report at least 50% improvement with ease for voiding bowel to improve his QOL. Status at Progress note: difficulty with emptying; improving gradually 1-4-23  Current: fluctuating due to elevated PFM's tone 1-30-23    2. Patient will have increased PFM motor performance by >/= 1/2 to 1 grade for improvement in function and increased quality of life. Status at Progress note: 3-/5  1-4-23  Current: pain & spasm with max contraction today 1-25-23     4.  Patient will have Bowel Leakage FOTO score change of 11 points indicating  Status at Progress note: initial assessment: 48; progressing gradually, improve 6 points 1-4-23  Current: improved 10 points compared to initial assessment 1-30-23      PLAN  [x]  Upgrade activities as tolerated     [x]  Continue plan of care  []  Update interventions per flow sheet       []  Discharge due to:_  []  Other:_      Devonte Taylor 1/30/2023  7:46 AM    Future Appointments   Date Time Provider Zoraida Marsh   1/30/2023 12:00 PM Jose G Mullins [facility]

## 2023-01-30 NOTE — PROGRESS NOTES
[facility] - Courtney Saha  [address]  [phone] fax    Pelvic Floor Progress Note    Patient name: Camilla Dumont Start of Care: 2022   Referral source: Radha Melendez MD : 1985               Medical Diagnosis: Fecal incontinence [R15.9]  Payor:  / Plan: Saul Beck 72 / Product Type:  /  Onset Date:2022               Treatment Diagnosis: bowel incontinence, PFD, difficulty voiding   Prior Hospitalization: see medical history Provider#: [id number]   Medications: Verified on Patient summary List    Comorbidities: history of malignant carcinoid tumor, nissen fundoplication, colon resection, chemo port placement, ileostomy reversal   Prior Level of Function: ind with all mobility, no leakage        Visits from Start of Care: 13    Missed Visits: 0    Established Goals:           Excellent Good         Limited           None  [x] Increase Pelvic MM strength []  []  [x]  []  [] Decrease Pelvic MM hypertonus []  []  []  []  [x] Decrease Incontinence Episodes []  []  [x]  []   [x] Improve Voiding Habits  []  []  [x]  []  [] Decreased Urgency   []  []  []  []    Key Functional Changes: limited progression due to elevated tone of PFM    Updated Goals: to be achieved in 4 weeks:  1. Patient will demonstrate accurate performance of home exercise program as adjunct to PT clinic visits. Status at Progress note: good compliance, focusing more on relaxation 23     2. Patient will have decreased fecal incontinence episodes to < or =3x/week for more normal function. Status at Progress note: more than 1x/week; limited progression 23     3. Patient will report at least 50% improvement with ease for voiding bowel to improve his QOL. Status at Progress note: difficulty with emptying; fluctuating due to elevated PFM's tone 23     2.  Patient will have increased PFM motor performance by >/= 1/2 to 1 grade for improvement in function and increased quality of life. Status at Progress note: 3-/5; pain & spasm with max contraction today 1-25-23     4. Patient will have Bowel Leakage FOTO score change of 11 points indicating  Status at Progress note: initial assessment: 48; progressing gradually, mproved 10 points compared to initial assessment 1-30-23       ASSESSMENT/RECOMMENDATIONS: Pt demonstrates slow progression during the last several weeks due to increased tone & pain of PFM after increasing holding time. He requires more pushing for voiding bowel. He reports good pain relief with external TENS. Pt's to obtain pelvic wand for internal manual to improve PFM's tone. He would also benefit from NMES unit to improve PFM's tone & strength for improved fecal incont. Skilled Pelvic Floor PT will be beneficial for pt to Increase pelvic floor muscle strength, Improve quality of pelvic floor contractions, Decrease resting tone of the pelvic floor, Increase tissue extensibility of the pelvic floor muscles, Increase core strength, Inhibit abnormal muscle activity, and Improve lumbosacral and coccygeal mobility in order to Increase urinary continence, Decrease bowel urgency, Improve frequency and ease of bowel movements, and Improve ability to perform ADLs.     [x]Continue therapy per initial plan/protocol at a frequency of  1-2 x per week for 4 weeks  []Continue therapy with the following recommended changes:_____________________      _____________________________________________________________________  []Discontinue therapy progressing towards or have reached established goals  []Discontinue therapy due to lack of appreciable progress towards goals  []Discontinue therapy due to lack of attendance or compliance  []Await Physician's recommendations/decisions regarding therapy  []Other:________________________________________________________________    Thank you for this referral.   Ulysses Homestead 1/30/2023 7:48 AM  NOTE TO PHYSICIAN:  PLEASE COMPLETE THE ORDERS BELOW AND   FAX TO [facility]: ([phone]  If you are unable to process this request in 24 hours please contact our office: [phone]    I have read the above report and request that my patient continue as recommended. I have read the above report and request that my patient continue therapy with the following changes/special instructions:___________________________________________________________  I have read the above report and request that my patient be discharged from therapy.     Physician's Signature:____________Date:_________TIME:________     Costa Duffy MD  ** Signature, Date and Time must be completed for valid certification **

## 2023-02-03 ENCOUNTER — TELEPHONE (OUTPATIENT)
Dept: PHYSICAL THERAPY | Age: 38
End: 2023-02-03

## 2023-02-07 ENCOUNTER — HOSPITAL ENCOUNTER (OUTPATIENT)
Dept: PHYSICAL THERAPY | Age: 38
Discharge: HOME | End: 2023-02-07
Payer: OTHER GOVERNMENT

## 2023-02-07 PROCEDURE — 97014 ELECTRIC STIMULATION THERAPY: CPT

## 2023-02-07 PROCEDURE — 97530 THERAPEUTIC ACTIVITIES: CPT

## 2023-02-07 PROCEDURE — 97110 THERAPEUTIC EXERCISES: CPT

## 2023-02-07 PROCEDURE — 97140 MANUAL THERAPY 1/> REGIONS: CPT

## 2023-02-07 NOTE — PROGRESS NOTES
PF DAILY TREATMENT NOTE 3-16    Patient Name: Jez Most  Date:2023  : 1985  [x]  Patient  Verified  Payor: MICHAEL / Plan: Saul Beck 72 / Product Type:  /    In time: 12:03  Out time: 1:05  Total Treatment Time (min): 62  Visit #: 1 of 4-8      Treatment Area: [x] Pelvic Floor     [] Other:    SUBJECTIVE  Pain Level (0-10 scale): 0/10  Any medication changes, allergies to medications, adverse drug reactions, diagnosis change, or new procedure performed?: [x] No    [] Yes (see summary sheet for update)  Subjective functional status/changes:   [] No changes reported  Pt reports doing some mountain biking for about 3 miles; he had some pain with Pelvic Floor/rectal area afterwards. Pain calmed down after some heat & lying down rest. He order the vibration pelvic wand which should arrive today. Pt hasn't been doing any Kegel due to elevated tone & pain; he's consistent with relaxation exercises. OBJECTIVE    Modality rationale: decrease pain and increase tissue extensibility to improve the patients ability to tolerate ADLs.    Min Type Additional Details   15 [x] Estim:  [x]Unatt       []IFC  []Premod                        [x]Other: HV []w/ice   [x]w/heat  Position: prone  Location: PRM/rectal     [] Estim: []Att    []TENS instruct  []NMES                    []Other:  []w/US   []w/ice   []w/heat  Position:  Location:    []  Ultrasound: []Continuous   [] Pulsed                           []1MHz   []3MHz Position:  Location:    []  Ice     []  heat  []  Ice massage  []  Laser   []  Anodyne Position:  Location:   [x] Skin assessment post-treatment:  [x]intact []redness- no adverse reaction    []redness - adverse reaction:         23 min Therapeutic Exercise:  [x] See flow sheet :   []  Pelvic floor strengthening                 [x]  Pelvic floor downtraining  []  Quality pelvic floor contractions       [x]  Relaxation techniques  []  Urge suppression exercises  [] Other:  Rationale: Increase pelvic floor muscle strength, Improve quality of pelvic floor contractions, Decrease resting tone of the pelvic floor, Increase tissue extensibility of the pelvic floor muscles, Increase core strength, Inhibit abnormal muscle activity, and Improve lumbosacral and coccygeal mobility in order to Increase fecal continence, Decrease bowel urgency, Improve frequency and ease of bowel movements, and Improve ability to perform ADLs. 10 min Therapeutic Activity:  []  See flow sheet :    []  Increase Tissue extensibility        [x]  Assess fiber intake    [x]  Assess voiding habits  [x]  Assess bowel habits  [x]  Other: pelvic wand use   Rationale: Increase pelvic floor muscle strength, Improve quality of pelvic floor contractions, Decrease resting tone of the pelvic floor, Increase tissue extensibility of the pelvic floor muscles, Increase core strength, Inhibit abnormal muscle activity, and Improve lumbosacral and coccygeal mobility in order to Increase fecal continence, Decrease bowel urgency, Improve frequency and ease of bowel movements, and Improve ability to perform ADLs. 14 min Manual Therapy:  intra-rectal MFR, focussed on rectal sphincter   The manual therapy interventions were performed at a separate and distinct time from the therapeutic activities interventions. Rationale: Increase pelvic floor muscle strength, Improve quality of pelvic floor contractions, Decrease resting tone of the pelvic floor, Increase tissue extensibility of the pelvic floor muscles, Increase core strength, Inhibit abnormal muscle activity, and Improve lumbosacral and coccygeal mobility in order to Increase fecal continence, Decrease bowel urgency, Improve frequency and ease of bowel movements, and Improve ability to perform ADLs.             With   [] TE   [] TA   [] neuro  [] manual   [] other: Patient Education: [x] Review HEP    [] Progressed/Changed HEP based on:   [] positioning   [] body mechanics   [] transfers   [] heat/ice application    [] other:      Other Objective/Functional Measures:   []baseline resting tone:   []slow twitch mms []fast twitch mms    Pain Level (0-10 scale) post treatment: 0/10    ASSESSMENT/Changes in Function: pt cont to reports symptoms with elevated tone of PFM. He tolerated manual well & reported good understanding with pelvic wand use. Notes trigger point at 3-4 o'clock on Left rectal sphincter. Will progress as tolerated. []  Decrease # of leaks   [] No change []  Improving [] Resolved     []  Decrease hypertonus [] No change []  Improving [] Resolved     []  Increase void interval [] No change []  Improving [] Resolved     []  Increase PF strength [] No change []  Improving [] Resolved     []  Increase PF endurance [] No change []  Improving [] Resolved     []  Increase endurance [] No change []  Improving [] Resolved     []  Decrease # of pads [] No change []  Improving [] Resolved     []  Decrease pain [] No change []  Improving [] Resolved     []  Increased coordination [] No change []  Improving [] Resolved     []  Increased Bowel Frequency [] No change []  Improving [] Resolved       Patient will continue to benefit from skilled PT services to modify and progress therapeutic interventions, address functional mobility deficits, address ROM deficits, address strength deficits, analyze and address soft tissue restrictions, analyze and cue movement patterns, analyze and modify body mechanics/ergonomics, assess and modify postural abnormalities, address imbalance/dizziness, and instruct in home and community integration to attain remaining goals. []  See Plan of Care  [x]  See progress note/recertification  []  See Discharge Summary         Progress towards goals / Updated goals:  Updated Goals: to be achieved in 4 weeks:  1. Patient will demonstrate accurate performance of home exercise program as adjunct to PT clinic visits.    Status at Progress note: good compliance, focusing more on relaxation 1-30-23     2. Patient will have decreased fecal incontinence episodes to < or =3x/week for more normal function. Status at Progress note: more than 1x/week; limited progression 1-30-23     3. Patient will report at least 50% improvement with ease for voiding bowel to improve his QOL. Status at Progress note: difficulty with emptying; fluctuating due to elevated PFM's tone 1-30-23     2. Patient will have increased PFM motor performance by >/= 1/2 to 1 grade for improvement in function and increased quality of life. Status at Progress note: 3-/5; pain & spasm with max contraction today 1-25-23     4.  Patient will have Bowel Leakage FOTO score change of 11 points indicating  Status at Progress note: initial assessment: 48; progressing gradually, mproved 10 points compared to initial assessment 1-30-23      PLAN  [x]  Upgrade activities as tolerated     [x]  Continue plan of care  []  Update interventions per flow sheet       []  Discharge due to:_  []  Other:_      Lynnwood Brunner 2/7/2023  9:14 AM    Future Appointments   Date Time Provider Zoraida Marsh   2/7/2023 12:00 PM Cristine ROSARIO [facility]   2/10/2023 11:00 AM Cristine ZAMORAPTRACHID [facility]

## 2023-02-10 ENCOUNTER — HOSPITAL ENCOUNTER (OUTPATIENT)
Dept: PHYSICAL THERAPY | Age: 38
Discharge: HOME | End: 2023-02-10
Payer: OTHER GOVERNMENT

## 2023-02-10 PROCEDURE — 97110 THERAPEUTIC EXERCISES: CPT

## 2023-02-10 PROCEDURE — 97530 THERAPEUTIC ACTIVITIES: CPT

## 2023-02-10 PROCEDURE — 97014 ELECTRIC STIMULATION THERAPY: CPT

## 2023-02-10 NOTE — PROGRESS NOTES
PF DAILY TREATMENT NOTE 3-    Patient Name: Yoselyn Mcginnis  Date:2/10/2023  : 1985  [x]  Patient  Verified  Payor:  / Plan: [facility] / Product Type:  /    In time: 11:05  Out time:12:07  Total Treatment Time (min): 62  Visit #: 2 of 4-8    Treatment Area: [x] Pelvic Floor     [] Other:      SUBJECTIVE  Pain Level (0-10 scale): 6/10  Any medication changes, allergies to medications, adverse drug reactions, diagnosis change, or new procedure performed?: [x] No    [] Yes (see summary sheet for update)  Subjective functional status/changes:   [] No changes reported  Pt reports having pain & urgency last night. He's been focusing on relaxation. He got his pelvic wand but hasn't tried it yet.        OBJECTIVE    Modality rationale: decrease pain and increase tissue extensibility to improve the patients ability to tolerate ADLs   Min Type Additional Details   10 [x] Estim:  []Unatt       []IFC  []Premod                        [x]Other: HV []w/ice   [x]w/heat  Position: prone  Location: PFM    [] Estim: []Att    []TENS instruct  []NMES                    []Other:  []w/US   []w/ice   []w/heat  Position:  Location:    []  Ultrasound: []Continuous   [] Pulsed                           []1MHz   []3MHz Position:  Location:    []  Ice     []  heat  []  Ice massage  []  Laser   []  Anodyne Position:  Location:   [x] Skin assessment post-treatment:  [x]intact []redness- no adverse reaction    []redness - adverse reaction:         40 min Therapeutic Exercise:  [] See flow sheet :   []  Pelvic floor strengthening                 [x]  Pelvic floor downtraining  []  Quality pelvic floor contractions       [x]  Relaxation techniques  []  Urge suppression exercises  []  Other:  Rationale: Increase pelvic floor muscle strength, Improve quality of pelvic floor contractions, Decrease resting tone of the pelvic floor, Increase tissue extensibility of the pelvic floor muscles, Increase core strength, Inhibit abnormal muscle activity, and Improve lumbosacral and coccygeal mobility in order to Increase fecal continence, Decrease bowel urgency, Improve frequency and ease of bowel movements, and Improve ability to perform ADLs. 12 min Therapeutic Activity:  []  See flow sheet :    []  Increase Tissue extensibility        [x]  Assess fiber intake    [x]  Assess voiding habits  [x]  Assess bowel habits  [x]  Other: pelvic wand use   Rationale:  Increase pelvic floor muscle strength, Improve quality of pelvic floor contractions, Decrease resting tone of the pelvic floor, Increase tissue extensibility of the pelvic floor muscles, Increase core strength, Inhibit abnormal muscle activity, and Improve lumbosacral and coccygeal mobility in order to Increase fecal continence, Decrease bowel urgency, Improve frequency and ease of bowel movements, and Improve ability to perform ADLs. With   [] TE   [] TA   [] neuro  [] manual   [] other: Patient Education: [x] Review HEP    [] Progressed/Changed HEP based on:   [] positioning   [] body mechanics   [] transfers   [] heat/ice application    [] other:      Other Objective/Functional Measures:   []baseline resting tone:   []slow twitch mms   []fast twitch mms    Pain Level (0-10 scale) post treatment: 4/10    ASSESSMENT/Changes in Function: Pt cont to struggle with PFM's elevated tone. He reports mod relief with stretching/relaxation tech & ESTIM. Encouraged to start pelvic wand MFR today; pt verbalized good understanding of pelvic wand use. Progress as tolerated.        []  Decrease # of leaks   [] No change []  Improving [] Resolved     []  Decrease hypertonus [] No change []  Improving [] Resolved     []  Increase void interval [] No change []  Improving [] Resolved     []  Increase PF strength [] No change []  Improving [] Resolved     []  Increase PF endurance [] No change []  Improving [] Resolved     []  Increase endurance [] No change []  Improving [] Resolved     []  Decrease # of pads [] No change []  Improving [] Resolved     []  Decrease pain [] No change []  Improving [] Resolved     []  Increased coordination [] No change []  Improving [] Resolved     []  Increased Bowel Frequency [] No change []  Improving [] Resolved       Patient will continue to benefit from skilled PT services to modify and progress therapeutic interventions, address functional mobility deficits, address ROM deficits, address strength deficits, analyze and address soft tissue restrictions, analyze and cue movement patterns, analyze and modify body mechanics/ergonomics, assess and modify postural abnormalities, address imbalance/dizziness, and instruct in home and community integration to attain remaining goals. []  See Plan of Care  [x]  See progress note/recertification  []  See Discharge Summary         Progress towards goals / Updated goals:  Updated Goals: to be achieved in 4 weeks:  1. Patient will demonstrate accurate performance of home exercise program as adjunct to PT clinic visits. Status at Progress note: good compliance, focusing more on relaxation 1-30-23     2. Patient will have decreased fecal incontinence episodes to < or =3x/week for more normal function. Status at Progress note: more than 1x/week; limited progression 1-30-23     3. Patient will report at least 50% improvement with ease for voiding bowel to improve his QOL. Status at Progress note: difficulty with emptying; fluctuating due to elevated PFM's tone 1-30-23  Current: challenged due to elevated tone 2-10-23     2. Patient will have increased PFM motor performance by >/= 1/2 to 1 grade for improvement in function and increased quality of life. Status at Progress note: 3-/5; pain & spasm with max contraction today 1-25-23     4.  Patient will have Bowel Leakage FOTO score change of 11 points indicating  Status at Progress note: initial assessment: 48; progressing gradually, mproved 10 points compared to initial assessment 1-30-23      PLAN  [x]  Upgrade activities as tolerated     [x]  Continue plan of care  []  Update interventions per flow sheet       []  Discharge due to:_  []  Other:_      Matthew Tapia 2/10/2023  10:53 AM    Future Appointments   Date Time Provider Zoraida Marsh   2/10/2023 11:00 AM Nerissa Postal [facility]

## 2023-03-08 ENCOUNTER — HOSPITAL ENCOUNTER (OUTPATIENT)
Facility: HOSPITAL | Age: 38
Setting detail: RECURRING SERIES
Discharge: HOME | End: 2023-03-11
Payer: OTHER GOVERNMENT

## 2023-03-08 PROCEDURE — 97110 THERAPEUTIC EXERCISES: CPT

## 2023-03-08 PROCEDURE — G0283 ELEC STIM OTHER THAN WOUND: HCPCS

## 2023-03-08 PROCEDURE — 97530 THERAPEUTIC ACTIVITIES: CPT

## 2023-03-08 NOTE — PROGRESS NOTES
PF DAILY TREATMENT NOTE (2023)      Patient Name: Bing Lechuga    Date: 3/8/2023    : 1985  Insurance: Payor: [email or web address] / Plan: [email or web address] / Product Type: *No Product type* /      Patient  verified yes     Visit #   Current / Total 3 4-8   Time   In / Out 2:00 3:02   Pain   In / Out 5/10 3/10   Subjective Functional Status/Changes: Pt reports being busy with his schedule and hasn't been able to keep up with fiber intake. He still notes a lot of tension like headache pain with his PFM. Changes to:  Meds, Allergies, Med Hx, Sx Hx? If yes, update Summary List no       TREATMENT AREA =  Full incontinence of feces [R15.9]      OBJECTIVE    Modalities Rationale:     decrease pain and increase tissue extensibility to improve patient's ability to progress to PLOF and address remaining functional goals. 15 min [] Estim Unattended, type/location: High voltage/rectal PFM, pt's in prone,                                      []  w/ice    [x]  w/heat    min [] Estim Attended, type/location:                                     []  w/US     []  w/ice    []  w/heat    []  TENS insruct      min []  Mechanical Traction: type/lbs                   []  pro   []  sup   []  int   []  cont    []  before manual    []  after manual    min []  Ultrasound, settings/location:      min []  Iontophoresis w/ dexamethasone, location:                                               []  take home patch       []  in clinic    min  unbill []  Ice     []  Heat    location/position:     min []  Paraffin,  details:     min []  Vasopneumatic Device, press/temp:     min []  Betsy Steffany / Dominic David:     If using vaso (only need to measure limb vaso being performed on)      pre-treatment girth :       post-treatment girth :       measured at (landmark location) :      min []  Other:    Skin assessment post-treatment (if applicable):    [x]  intact    []  redness- no adverse reaction                 []redness - adverse reaction:             24 min Therapeutic Exercise:  [] See flow sheet :   []  Pelvic floor strengthening                 [x]  Pelvic floor downtraining  []  Quality pelvic floor contractions       [x]  Relaxation techniques  []  Urge suppression exercises  []  Other:  Rationale:  Increase pelvic floor muscle strength, Improve quality of pelvic floor contractions, Decrease resting tone of the pelvic floor, Increase tissue extensibility of the pelvic floor muscles, Increase core strength, Inhibit abnormal muscle activity, and Improve lumbosacral and coccygeal mobility in order to Increase fecal continence, Decrease bowel urgency, and Improve frequency and ease of bowel movements. 23 min Therapeutic Activity:  []  See flow sheet :    []  Increase Tissue extensibility        [x]  Assess fiber intake    [x]  Assess voiding habits  [x]  Assess bowel habits  []  Other:   Rationale: Increase pelvic floor muscle strength, Improve quality of pelvic floor contractions, Decrease resting tone of the pelvic floor, Increase tissue extensibility of the pelvic floor muscles, Increase core strength, Inhibit abnormal muscle activity, and Improve lumbosacral and coccygeal mobility in order to Increase fecal continence, Decrease bowel urgency, and Improve frequency and ease of bowel movements.             With   [] TE   [] TA   [] neuro  [] manual   [] other: Patient Education: [x] Review HEP    [] Progressed/Changed HEP based on:   [] positioning   [] body mechanics   [] transfers   [] heat/ice application    [] other:      Other Objective/Functional Measures:   []baseline resting tone:   []slow twitch mms   []fast twitch mms    Pain Level (0-10 scale) post treatment: 3/10    ASSESSMENT/Changes in Function: see Progress note    []  Decrease # of leaks   [] No change []  Improving [] Resolved     []  Decrease hypertonus [] No change []  Improving [] Resolved     []  Increase void interval [] No change []  Improving [] Resolved     []  Increase PF strength [] No change []  Improving [] Resolved     []  Increase PF endurance [] No change []  Improving [] Resolved     []  Increase endurance [] No change []  Improving [] Resolved     []  Decrease # of pads [] No change []  Improving [] Resolved     []  Decrease pain [] No change []  Improving [] Resolved     []  Increased coordination [] No change []  Improving [] Resolved     []  Increased Bowel Frequency [] No change []  Improving [] Resolved       Patient will continue to benefit from skilled PT / OT services to modify and progress therapeutic interventions, analyze and address functional mobility deficits, analyze and address ROM deficits, analyze and address strength deficits, analyze and address soft tissue restrictions, analyze and cue for proper movement patterns, analyze and modify for postural abnormalities, analyze and address imbalance/dizziness, and instruct in home and community integration to address functional deficits and attain remaining goals. []  See Plan of Care  [x]  See progress note/recertification  []  See Discharge Summary           Progress towards goals / Updated goals:  Updated Goals: to be achieved in 4 weeks:  1. Patient will demonstrate accurate performance of home exercise program as adjunct to PT clinic visits. Status at Progress note: good compliance, focusing more on relaxation 1-30-23  Current: fair compliance 3-8-23    2. Patient will have decreased fecal incontinence episodes to < or =3x/week for more normal function. Status at Progress note: more than 1x/week; limited progression 1-30-23  Current: no significant change  3-8-23    3. Patient will report at least 50% improvement with ease for voiding bowel to improve his QOL. Status at Progress note: difficulty with emptying; fluctuating due to elevated PFM's tone 1-30-23  Current: challenged due to elevated tone 2-10-23; no significant change  3-8-23     2.  Patient will have increased PFM motor performance by >/= 1/2 to 1 grade for improvement in function and increased quality of life. Status at Progress note: 3-/5; pain & spasm with max contraction today 1-25-23  Current: no significant change  3-8-23    4.  Patient will have Bowel Leakage FOTO score change of 11 points indicating  Status at Progress note: initial assessment: 48; progressing gradually, mproved 10 points compared to initial assessment (58) 1-30-23  Current: no significant change compared to last assessment (57) 3-8-23       PLAN  [x]  Upgrade activities as tolerated     [x]  Continue plan of care  []  Update interventions per flow sheet       []  Discharge due to:_  []  Other:_      Ashwin Ser, PT 3/8/2023  10:13 AM    Future Appointments   Date Time Provider Ifeoma Gray   3/8/2023  2:00 PM Thienphuc Mikayla Rona, PT [id number]   3/15/2023  6:00 PM Thienphuc Mikayla Rona, PT [id number]   3/22/2023  5:00 PM Thienphuc Mikayla Rona, PT [id number]   3/29/2023  3:00 PM Thienphuc Mikayla Rona, PT [id number]

## 2023-03-08 NOTE — THERAPY RECERTIFICATION
[facility] at [address] [place] [address]  Ph [phone]  Fx [phone]    Pelvic Floor Progress Note     Patient name: Kavitha Webb Start of Care: 2022   Referral source: Bob Charles MD : 1985               Medical Diagnosis: Fecal incontinence [R15.9]  Payor:  / Plan: [employer] 72 / Product Type:  /  Onset Date:2022               Treatment Diagnosis: bowel incontinence, PFD, difficulty voiding   Prior Hospitalization: see medical history Provider#: [id number]   Medications: Verified on Patient summary List    Comorbidities: history of malignant carcinoid tumor, nissen fundoplication, colon resection, chemo port placement, ileostomy reversal   Prior Level of Function: ind with all mobility, no leakage      Visits from Start of Care: 16    Missed Visits: 2      Established Goals:             Excellent   Good           Limited             None  [x] Increase Pelvic MM strength        []      []  [x]  []  [x] Decrease Pelvic MM hypertonus      []      []  [x]  []  [x] Decrease Incontinence Episodes     []      []  [x]  []   [x] Improve Voiding Habits         []      []  [x]  []  [] Decreased Urgency         []      []  []  []    Key Functional Changes: limited progression, gap with PT due to busy personal schedule during Feb      Updated Goals: to be achieved in 4 weeks:  1. Patient will demonstrate accurate performance of home exercise program as adjunct to PT clinic visits. Status at Progress note: fair compliance 3-8-23     2. Patient will have decreased fecal incontinence episodes to < or =3x/week for more normal function. Status at Progress note: more than 1x/week; no significant change  3-8-23     3. Patient will report at least 50% improvement with ease for voiding bowel to improve his QOL. Status at Progress note: no significant change  3-8-23     2.  Patient will have increased PFM motor performance by >/= 1/2 to 1 grade for improvement in function and increased quality of life. Status at Progress note: 3-/5; no significant change  3-8-23     4. Patient will have Bowel Leakage FOTO score change of 11 points indicating  Status at Progress note: initial assessment: 48; no significant change compared to last assessment (57) 3-8-23      ASSESSMENT/RECOMMENDATIONS: Pt had 1 month gap with PT due to busy personal schedule. Pt reports fair compliance with pelvic wand and HEP. He cont to notes elevated tone/tightness of PFM contributing to difficulty with voiding and poor progression of strengthening. Pt would highly benefit from NEMS unit to improve PFM's tone/function; will discuss with MD to obtain script & unit. Pt would cont to benefit from skilled Pelvic Floor PT Increase pelvic floor muscle strength, Improve quality of pelvic floor contractions, Decrease resting tone of the pelvic floor, Increase tissue extensibility of the pelvic floor muscles, Increase core strength, Inhibit abnormal muscle activity, and Improve lumbosacral and coccygeal mobility in order to Increase fecal continence, Decrease bowel urgency, and Improve frequency and ease of bowel movements.       [x]Continue therapy per initial plan/protocol at a frequency of  1-2 x per week for 4 weeks  []Continue therapy with the following recommended changes:_____________________      _____________________________________________________________________  []Discontinue therapy progressing towards or have reached established goals  []Discontinue therapy due to lack of appreciable progress towards goals  []Discontinue therapy due to lack of attendance or compliance  []Await Physician's recommendations/decisions regarding thera py  []Other:________________________________________________________________    Thank you for this referral.   Frank Ramirez, PT 3/8/2023 10:15 AM  NOTE TO PHYSICIAN:  PLEASE COMPLETE THE ORDERS BELOW AND   [facility]: [phone]  If you are unable to process this request in 24 hours please contact our office:   [phone]    I have read the above report and request that my patient continue as recommended. I have read the above report and request that my patient continue therapy with the following changes/special instructions:___________________________________________________________  I have read the above report and request that my patient be discharged from therapy.     Physician's Signature:____________Date:_________TIME:________     Brittani Wade MD  ** Signature, Date and Time must be completed for valid certification **

## 2023-03-22 ENCOUNTER — HOSPITAL ENCOUNTER (OUTPATIENT)
Facility: HOSPITAL | Age: 38
Setting detail: RECURRING SERIES
Discharge: HOME | End: 2023-03-25
Payer: OTHER GOVERNMENT

## 2023-03-22 PROCEDURE — 97140 MANUAL THERAPY 1/> REGIONS: CPT

## 2023-03-22 PROCEDURE — 97530 THERAPEUTIC ACTIVITIES: CPT

## 2023-03-22 PROCEDURE — 97110 THERAPEUTIC EXERCISES: CPT

## 2023-03-22 PROCEDURE — G0283 ELEC STIM OTHER THAN WOUND: HCPCS

## 2023-03-22 NOTE — PROGRESS NOTES
continence, Improve frequency and ease of bowel movements, and Improve ability to perform ADLs. With   [] TE   [] TA   [] neuro  [] manual   [] other: Patient Education: [x] Review HEP    [] Progressed/Changed HEP based on:   [] positioning   [] body mechanics   [] transfers   [] heat/ice application    [] other:      Other Objective/Functional Measures:   []baseline resting tone:   []slow twitch mms   []fast twitch mms    Pain Level (0-10 scale) post treatment: 0/10    ASSESSMENT/Changes in Function: Pt reports improving tension/tightness of PFM but cont to be challenge with urge control and voiding/emptying bowel. He usually has 6-8 Bms a day with incomplete voiding sensation. Pt only uses Pelvic wand 1x/week. Pt is cleared by MD to use NMES, will contact MD to obtain script. He will start PT for his back soon (due to disc problem). Pt struggles with relaxation with internal manual but reports great pain/pressure relief post treatment. Will progress as tolerated.      []  Decrease # of leaks   [] No change []  Improving [] Resolved     []  Decrease hypertonus [] No change []  Improving [] Resolved     []  Increase void interval [] No change []  Improving [] Resolved     []  Increase PF strength [] No change []  Improving [] Resolved     []  Increase PF endurance [] No change []  Improving [] Resolved     []  Increase endurance [] No change []  Improving [] Resolved     []  Decrease # of pads [] No change []  Improving [] Resolved     []  Decrease pain [] No change []  Improving [] Resolved     []  Increased coordination [] No change []  Improving [] Resolved     []  Increased Bowel Frequency [] No change []  Improving [] Resolved       Patient will continue to benefit from skilled PT / OT services to modify and progress therapeutic interventions, analyze and address functional mobility deficits, analyze and address ROM deficits, analyze and address strength deficits, analyze and address soft tissue

## 2023-03-29 ENCOUNTER — HOSPITAL ENCOUNTER (OUTPATIENT)
Facility: HOSPITAL | Age: 38
Setting detail: RECURRING SERIES
Discharge: HOME | End: 2023-04-01
Payer: OTHER GOVERNMENT

## 2023-03-29 PROCEDURE — G0283 ELEC STIM OTHER THAN WOUND: HCPCS

## 2023-03-29 PROCEDURE — 97110 THERAPEUTIC EXERCISES: CPT

## 2023-03-29 PROCEDURE — 97140 MANUAL THERAPY 1/> REGIONS: CPT

## 2023-03-29 NOTE — PROGRESS NOTES
PF DAILY TREATMENT NOTE (2023)      Patient Name: Skyler Palmer    Date: 3/29/2023    : 1985  Insurance: Payor: [employer] / Plan: [employer] / Product Type: *No Product type* /      Patient  verified yes     Visit #   Current / Total 3 4-8   Time   In / Out 3:02 4:06   Pain   In / Out 0/10 0/10   Subjective Functional Status/Changes: Pt was very sore with his rectal area after MT. He was only able to perform MFR 1x since last visit. He feels like something still pressing on the pressure point. Pt hasn't been able to discuss NMES with MD.    Changes to:  Meds, Allergies, Med Hx, Sx Hx? If yes, update Summary List no       TREATMENT AREA =  Full incontinence of feces [R15.9]      OBJECTIVE    Modalities Rationale:     decrease pain and increase tissue extensibility to improve patient's ability to progress to PLOF and address remaining functional goals. 10 with set up time min [x] Estim Unattended, type/location: High voltage, PFM, pt's in prone                                     []  w/ice    [x]  w/heat    min [] Estim Attended, type/location:                                     []  w/US     []  w/ice    []  w/heat    []  TENS insruct      min []  Mechanical Traction: type/lbs                   []  pro   []  sup   []  int   []  cont    []  before manual    []  after manual    min []  Ultrasound, settings/location:      min []  Iontophoresis w/ dexamethasone, location:                                               []  take home patch       []  in clinic    min  unbill []  Ice     []  Heat    location/position:     min []  Paraffin,  details:     min []  Vasopneumatic Device, press/temp:     min []  Gar Relic / Floydene Barges:     If using vaso (only need to measure limb vaso being performed on)      pre-treatment girth :       post-treatment girth :       measured at (landmark location) :      min []  Other:    Skin assessment post-treatment (if applicable):    [x]  intact    []  redness- no adverse

## 2023-04-17 ENCOUNTER — HOSPITAL ENCOUNTER (OUTPATIENT)
Facility: HOSPITAL | Age: 38
Setting detail: RECURRING SERIES
Discharge: HOME | End: 2023-04-20
Payer: OTHER GOVERNMENT

## 2023-04-17 PROCEDURE — 97110 THERAPEUTIC EXERCISES: CPT

## 2023-04-17 PROCEDURE — 97530 THERAPEUTIC ACTIVITIES: CPT

## 2023-04-17 NOTE — PROGRESS NOTES
symptoms aggravate sometimes. Non-Medicare, can change goals, can adjust or add frequency duration, no signature required      New Goals to be achieved in __4__ weeks  1. Patient will demonstrate accurate performance of home exercise program as adjunct to PT clinic visits. Status at Progress note: good compliance 4-17-23     2. Patient will have decreased fecal incontinence episodes to < or =3x/week for more normal function. Status at Progress note: more than 1x/week; improved control at day time, leakage with gas at night and before waking 4-7-23     3. Patient will have increased PFM motor performance by >/= 1/2 to 1 grade for improvement in function and increased quality of life. Status at Progress note: 3-/5; progressing well 4-17-23     4. Patient will have Bowel Leakage FOTO score change of 11 points indicating  Status at Progress note: nearly met, improved 10 point 4-17-23       Frequency / Duration:   Patient to be seen   1-2   times per week for   4    weeks:    RECOMMENDATIONS: Pt would cont to benefit from skilled Pelvic Floor PT Increase pelvic floor muscle strength, Improve quality of pelvic floor contractions, Decrease resting tone of the pelvic floor, Increase tissue extensibility of the pelvic floor muscles, Increase core strength, Inhibit abnormal muscle activity, and Improve lumbosacral and coccygeal mobility in order to Increase fecal continence, Decrease bowel urgency, Improve frequency and ease of bowel movements, and Improve ability to perform ADLs. We would like to continue therapy for progress to goals stated above. Continue per initial Plan of Care. If you have any questions/comments please contact us directly. Thank you for allowing us to assist in the care of your patient.     Love Mata, PT       4/17/2023       7:51 AM
4/28/2023  8:00 AM Aldo Siddiqi, PT [id number]   5/1/2023  8:00 AM Aldo Siddiqi, PT [id number]   5/3/2023  3:20 PM Kaitlin Roldan, PT [id number]

## 2023-04-26 ENCOUNTER — HOSPITAL ENCOUNTER (OUTPATIENT)
Facility: HOSPITAL | Age: 38
Setting detail: RECURRING SERIES
Discharge: HOME | End: 2023-04-29
Payer: OTHER GOVERNMENT

## 2023-04-26 PROCEDURE — 97110 THERAPEUTIC EXERCISES: CPT

## 2023-04-26 PROCEDURE — 97530 THERAPEUTIC ACTIVITIES: CPT

## 2023-04-26 PROCEDURE — G0283 ELEC STIM OTHER THAN WOUND: HCPCS

## 2023-04-26 NOTE — PROGRESS NOTES
PF DAILY TREATMENT NOTE (2023)      Patient Name: Lexy Lopez    Date: 2023    : 1985  Insurance: Payor:  EAST / Plan: [facility] / Product Type: *No Product type* /      Patient  verified yes     Visit #   Current / Total 1 4-8   Time   In / Out 2:44 3:21   Pain   In / Out 3/10 1-2/10   Subjective Functional Status/Changes: Pt reports feeling like fire at his anus last night & today. He tries to get more prune juice. He has difficulty clearing his bowel. He biked for 4 miles on Mon. Changes to:  Meds, Allergies, Med Hx, Sx Hx? If yes, update Summary List no       TREATMENT AREA =  Full incontinence of feces [R15.9]      OBJECTIVE  Modalities Rationale:     decrease pain and increase tissue extensibility to improve patient's ability to progress to PLOF and address remaining functional goals. 15 min [x] Estim Unattended, type/location: High Voltage/PFM, prone                                     []  w/ice    [x]  w/heat    min [] Estim Attended, type/location:                                     []  w/US     []  w/ice    []  w/heat    []  TENS insruct      min []  Mechanical Traction: type/lbs                   []  pro   []  sup   []  int   []  cont    []  before manual    []  after manual    min []  Ultrasound, settings/location:      min []  Iontophoresis w/ dexamethasone, location:                                               []  take home patch       []  in clinic    min  unbill []  Ice     []  Heat    location/position:     min []  Paraffin,  details:     min []  Vasopneumatic Device, press/temp:     min []  [place]aches / Manette Fairly:     If using vaso (only need to measure limb vaso being performed on)      pre-treatment girth :       post-treatment girth :       measured at (landmark location) :      min []  Other:    Skin assessment post-treatment (if applicable):    [x]  intact    []  redness- no adverse reaction                 []redness - adverse reaction:          Therapeutic

## 2023-04-28 ENCOUNTER — HOSPITAL ENCOUNTER (OUTPATIENT)
Facility: HOSPITAL | Age: 38
Setting detail: RECURRING SERIES
Discharge: HOME | End: 2023-05-01
Payer: OTHER GOVERNMENT

## 2023-04-28 PROCEDURE — 97140 MANUAL THERAPY 1/> REGIONS: CPT

## 2023-04-28 PROCEDURE — 97110 THERAPEUTIC EXERCISES: CPT

## 2023-04-28 PROCEDURE — 97530 THERAPEUTIC ACTIVITIES: CPT

## 2023-05-01 ENCOUNTER — APPOINTMENT (OUTPATIENT)
Facility: HOSPITAL | Age: 38
End: 2023-05-01
Payer: OTHER GOVERNMENT

## 2023-05-03 ENCOUNTER — HOSPITAL ENCOUNTER (OUTPATIENT)
Facility: HOSPITAL | Age: 38
Setting detail: RECURRING SERIES
Discharge: HOME | End: 2023-05-06
Payer: OTHER GOVERNMENT

## 2023-05-03 PROCEDURE — 97530 THERAPEUTIC ACTIVITIES: CPT

## 2023-05-03 PROCEDURE — 97112 NEUROMUSCULAR REEDUCATION: CPT

## 2023-05-03 NOTE — PROGRESS NOTES
pads [] No change []  Improving [] Resolved     []  Decrease pain [] No change []  Improving [] Resolved     []  Increased coordination [] No change []  Improving [] Resolved     []  Increased Bowel Frequency [] No change []  Improving [] Resolved          Patient will continue to benefit from skilled PT / OT services to modify and progress therapeutic interventions, analyze and address functional mobility deficits, analyze and address ROM deficits, analyze and address strength deficits, analyze and address soft tissue restrictions, analyze and cue for proper movement patterns, analyze and modify for postural abnormalities, analyze and address imbalance/dizziness, and instruct in home and community integration to address functional deficits and attain remaining goals. []  See Plan of Care  [x]  See progress note/recertification  []  See Discharge Summary            Progress towards goals / Updated goals:  Goals to be achieved in __4__ weeks  1. Patient will demonstrate accurate performance of home exercise program as adjunct to PT clinic visits. Status at Progress note: good compliance 4-17-23     2. Patient will have decreased fecal incontinence episodes to < or =3x/week for more normal function. Status at Progress note: more than 1x/week; improved control at day time, leakage with gas at night and before waking 4-7-23     3. Patient will have increased PFM motor performance by >/= 1/2 to 1 grade for improvement in function and increased quality of life. Status at Progress note: 3-/5; progressing well 4-17-23     4.  Patient will have Bowel Leakage FOTO score change of 11 points indicating  Status at Progress note: nearly met, improved 10 point 4-17-23        PLAN  [x]  Upgrade activities as tolerated     [x]  Continue plan of care  []  Update interventions per flow sheet       []  Discharge due to:_  []  Other:_        Bimal Varghese, PT 5/3/2023  9:17 AM    Future Appointments   Date Time Provider